# Patient Record
Sex: MALE | Race: WHITE | NOT HISPANIC OR LATINO | Employment: FULL TIME | URBAN - METROPOLITAN AREA
[De-identification: names, ages, dates, MRNs, and addresses within clinical notes are randomized per-mention and may not be internally consistent; named-entity substitution may affect disease eponyms.]

---

## 2017-03-16 ENCOUNTER — OFFICE VISIT (OUTPATIENT)
Dept: URGENT CARE | Facility: MEDICAL CENTER | Age: 42
End: 2017-03-16
Payer: COMMERCIAL

## 2017-03-16 PROCEDURE — G0382 LEV 3 HOSP TYPE B ED VISIT: HCPCS

## 2017-03-16 PROCEDURE — 87430 STREP A AG IA: CPT

## 2017-03-16 PROCEDURE — S9083 URGENT CARE CENTER GLOBAL: HCPCS

## 2017-08-15 ENCOUNTER — ALLSCRIPTS OFFICE VISIT (OUTPATIENT)
Dept: OTHER | Facility: OTHER | Age: 42
End: 2017-08-15

## 2017-08-15 DIAGNOSIS — N50.9 DISORDER OF MALE GENITAL ORGANS: ICD-10-CM

## 2017-08-15 LAB
BILIRUB UR QL STRIP: NORMAL
CLARITY UR: NORMAL
COLOR UR: YELLOW
GLUCOSE (HISTORICAL): NORMAL
HGB UR QL STRIP.AUTO: NORMAL
KETONES UR STRIP-MCNC: NORMAL MG/DL
LEUKOCYTE ESTERASE UR QL STRIP: NORMAL
NITRITE UR QL STRIP: NORMAL
PH UR STRIP.AUTO: 7 [PH]
PROT UR STRIP-MCNC: NORMAL MG/DL
SP GR UR STRIP.AUTO: 1.02
UROBILINOGEN UR QL STRIP.AUTO: 0.2

## 2017-08-16 ENCOUNTER — HOSPITAL ENCOUNTER (OUTPATIENT)
Dept: RADIOLOGY | Age: 42
Discharge: HOME | End: 2017-08-16
Payer: COMMERCIAL

## 2017-08-16 DIAGNOSIS — N50.9 DISORDER OF MALE GENITAL ORGANS: ICD-10-CM

## 2017-08-16 PROCEDURE — 76870 US EXAM SCROTUM: CPT

## 2017-08-31 ENCOUNTER — ALLSCRIPTS OFFICE VISIT (OUTPATIENT)
Dept: OTHER | Facility: OTHER | Age: 42
End: 2017-08-31

## 2017-08-31 LAB
BILIRUB UR QL STRIP: NORMAL
CLARITY UR: NORMAL
COLOR UR: YELLOW
GLUCOSE (HISTORICAL): NORMAL
HGB UR QL STRIP.AUTO: NORMAL
KETONES UR STRIP-MCNC: 80 MG/DL
LEUKOCYTE ESTERASE UR QL STRIP: NORMAL
NITRITE UR QL STRIP: NORMAL
PH UR STRIP.AUTO: 5.5 [PH]
PROT UR STRIP-MCNC: NORMAL MG/DL
SP GR UR STRIP.AUTO: 1.02
UROBILINOGEN UR QL STRIP.AUTO: 0.2

## 2018-01-14 VITALS
BODY MASS INDEX: 26.55 KG/M2 | WEIGHT: 196 LBS | HEIGHT: 72 IN | SYSTOLIC BLOOD PRESSURE: 144 MMHG | DIASTOLIC BLOOD PRESSURE: 100 MMHG

## 2018-01-14 VITALS
WEIGHT: 196 LBS | SYSTOLIC BLOOD PRESSURE: 128 MMHG | DIASTOLIC BLOOD PRESSURE: 84 MMHG | BODY MASS INDEX: 26.55 KG/M2 | HEIGHT: 72 IN

## 2018-02-28 DIAGNOSIS — N50.9 DISORDER OF MALE GENITAL ORGANS: ICD-10-CM

## 2018-03-01 ENCOUNTER — TELEPHONE (OUTPATIENT)
Dept: UROLOGY | Facility: AMBULATORY SURGERY CENTER | Age: 43
End: 2018-03-01

## 2018-03-01 NOTE — TELEPHONE ENCOUNTER
Patient should still get the [address] [facility] and follow up  Patient notified and will schedule US and make a F/U appt with Dr Veronica Sandhu

## 2018-03-01 NOTE — TELEPHONE ENCOUNTER
Patient would like a call back regarding an ultrasound  He says the lump he had was gone and wants to know if he should still get the test? Please call patient back

## 2018-05-21 ENCOUNTER — OFFICE VISIT (OUTPATIENT)
Dept: URGENT CARE | Facility: MEDICAL CENTER | Age: 43
End: 2018-05-21
Payer: COMMERCIAL

## 2018-05-21 ENCOUNTER — TELEPHONE (OUTPATIENT)
Dept: UROLOGY | Facility: AMBULATORY SURGERY CENTER | Age: 43
End: 2018-05-21

## 2018-05-21 VITALS
HEART RATE: 74 BPM | HEIGHT: 72 IN | TEMPERATURE: 98.3 F | DIASTOLIC BLOOD PRESSURE: 70 MMHG | WEIGHT: 193 LBS | OXYGEN SATURATION: 98 % | SYSTOLIC BLOOD PRESSURE: 116 MMHG | BODY MASS INDEX: 26.14 KG/M2 | RESPIRATION RATE: 20 BRPM

## 2018-05-21 DIAGNOSIS — D29.32 BENIGN NEOPLASM OF LEFT EPIDIDYMIS: Primary | ICD-10-CM

## 2018-05-21 DIAGNOSIS — H00.021 HORDEOLUM INTERNUM OF RIGHT UPPER EYELID: Primary | ICD-10-CM

## 2018-05-21 PROCEDURE — G0382 LEV 3 HOSP TYPE B ED VISIT: HCPCS | Performed by: FAMILY MEDICINE

## 2018-05-21 PROCEDURE — S9083 URGENT CARE CENTER GLOBAL: HCPCS | Performed by: FAMILY MEDICINE

## 2018-05-21 RX ORDER — ERYTHROMYCIN 5 MG/G
0.5 OINTMENT OPHTHALMIC EVERY 6 HOURS SCHEDULED
Qty: 3.5 G | Refills: 0 | Status: SHIPPED | OUTPATIENT
Start: 2018-05-21 | End: 2018-05-28

## 2018-05-21 NOTE — PROGRESS NOTES
Started Saturday with feeling like something was in right eye  Sunday morning swelling noted under eye  Take eye gtts  Used cold and hot compresses

## 2018-05-21 NOTE — TELEPHONE ENCOUNTER
LMOM unable to email to pt  Call back if he wants it faxed or mailed  I will put the order in [facility] chart  [facility]

## 2018-05-21 NOTE — PROGRESS NOTES
[address]        NAME: Caden Dewey is a 36 y o  male  : 1975    MRN: [id number]      Assessment and Plan   Hordeolum internum of right upper eyelid [H00 021]  1  Hordeolum internum of right upper eyelid  erythromycin (ILOTYCIN) ophthalmic ointment         Patient Instructions     Use antibiotic drops as directed    Always wipe away from eye with new part of rag  Cont warm compresses as directed  Follow up with PCP in 3-5 days  Proceed to  ER if symptoms worsen  Chief Complaint     Chief Complaint   Patient presents with    Eye Pain     x3 days          History of Present Illness       Right eye irritation x 3 days  Denies any discharge, changes in vision, contact lens use,  Fever/chills  Pt tried cold compresses and otc eye drops  Pt has never had this in past        Eye Pain    The right eye is affected  This is a new problem  Episode onset: x 3 days  The problem has been unchanged  There was no injury mechanism  The pain is at a severity of 4/10  The pain is mild  There is no known exposure to pink eye  He does not wear contacts  Associated symptoms include blurred vision, eye redness and a foreign body sensation  Pertinent negatives include no eye discharge, double vision, fever, itching, nausea, photophobia, recent URI or vomiting  Treatments tried: warm compress  Review of Systems   Review of Systems   Constitutional: Negative for chills, fatigue and fever  HENT: Negative for congestion, ear pain, hearing loss, postnasal drip, sinus pain, sinus pressure and sore throat  Eyes: Positive for blurred vision, pain and redness  Negative for double vision, photophobia, discharge and itching  Respiratory: Negative for chest tightness and shortness of breath  Cardiovascular: Negative for chest pain  Gastrointestinal: Negative for abdominal pain, constipation, nausea and vomiting  Genitourinary: Negative for difficulty urinating     Musculoskeletal: Negative for arthralgias and myalgias  Skin: Negative for rash  Neurological: Negative for dizziness and headaches  Psychiatric/Behavioral: Negative for behavioral problems  Current Medications       Current Outpatient Prescriptions:     B Complex Vitamins (B COMPLEX 1 PO), Take by mouth, Disp: , Rfl:     tadalafil (CIALIS) 20 MG tablet, Take 1 tablet by mouth, Disp: , Rfl:     erythromycin (ILOTYCIN) ophthalmic ointment, Administer 0 5 inches to the right eye every 6 (six) hours for 7 days, Disp: 3 5 g, Rfl: 0    oxybutynin (DITROPAN XL) 10 MG 24 hr tablet, Take by mouth, Disp: , Rfl:     Current Allergies     Allergies as of 05/21/2018    (No Known Allergies)            The following portions of the patient's history were reviewed and updated as appropriate: allergies, current medications, past family history, past medical history, past social history, past surgical history and problem list      Past Medical History:   Diagnosis Date    Cervicalgia     Epididymitis     Erectile dysfunction     Hypertension     Kidney stone     OAB (overactive bladder)        Past Surgical History:   Procedure Laterality Date    HERNIA REPAIR      VASECTOMY         No family history on file  Medications have been verified  Objective   /70   Pulse 74   Temp 98 3 °F (36 8 °C) (Temporal)   Resp 20   Ht 6' (1 829 m)   Wt 87 5 kg (193 lb)   SpO2 98%   BMI 26 18 kg/m²        Physical Exam     Physical Exam   Constitutional: He is oriented to person, place, and time  He appears well-developed and well-nourished  No distress  Eyes: EOM are normal  Pupils are equal, round, and reactive to light  Lids are everted and swept, no foreign bodies found  Right eye exhibits chemosis and hordeolum  Right eye exhibits no discharge  Right conjunctiva is injected  No scleral icterus  Mild erythema of lower right lid  No corneal abrasion seen on fluorescein stain      Cardiovascular: Normal rate, regular rhythm and normal heart sounds  Pulmonary/Chest: Effort normal and breath sounds normal  No respiratory distress  Neurological: He is alert and oriented to person, place, and time  Skin: No rash noted  Nursing note and vitals reviewed

## 2018-05-21 NOTE — PATIENT INSTRUCTIONS
Use antibiotic drops as directed    Always wipe away from eye with new part of rag  Cont warm compresses as directed  Stye   WHAT YOU NEED TO KNOW:   A stye is a lump on the edge or inside of your eyelid caused by an infection  A stye can form on your upper or lower eyelid  It usually goes away in 2 to 4 days  DISCHARGE INSTRUCTIONS:   Medicines:   · Antibiotic medicine: This is given as an ointment to put into your eye  It is used to fight an infection caused by bacteria  Use as directed  · Take your medicine as directed  Contact your healthcare provider if you think your medicine is not helping or if you have side effects  Tell him of her if you are allergic to any medicine  Keep a list of the medicines, vitamins, and herbs you take  Include the amounts, and when and why you take them  Bring the list or the pill bottles to follow-up visits  Carry your medicine list with you in case of an emergency  Follow up with your healthcare provider as directed:  Write down your questions so you remember to ask them during your visits  Self-care:   · Use warm compresses: This will help decrease swelling and pain  Wet a clean washcloth with warm water and place it on your eye for 10 to 15 minutes, 3 to 4 times each day or as directed  · Keep your hands away from your eye: This helps to prevent the spread of the infection to other parts of the eye  Wash your hands often with soap and dry with a clean towel  Do not squeeze the stye  · Do not use eye makeup:  Do not wear eye makeup while you have a stye  Eye makeup may carry bacteria and cause another stye  Throw away eye makeup and brushes used to apply the makeup  Use new eye makeup after the stye has gone away  Do not share eye makeup with others  · Prevent another stye:  Wash your face and clean your eyelashes every day  Remove eye makeup with makeup remover  This helps to completely remove eye makeup without heavy rubbing    Contact your healthcare provider if:   · You have redness and discharge around your eye, and your eye pain is getting worse  · Your vision changes  · The stye has not gone away within 7 days  · The stye comes back within a short period of time after treatment  · You have questions or concerns about your condition or care  © 2017 [address] [employer] Information is for End User's use only and may not be sold, redistributed or otherwise used for commercial purposes  All illustrations and images included in CareNotes® are the copyrighted property of [employer]  or Ulises Casey  The above information is an  only  It is not intended as medical advice for individual conditions or treatments  Talk to your doctor, nurse or pharmacist before following any medical regimen to see if it is safe and effective for you

## 2018-05-21 NOTE — TELEPHONE ENCOUNTER
Spoke with patient, he needs an ultrasound script emailed to him at [email or web address]     Please add in   Thank you

## 2018-11-10 RX ORDER — TADALAFIL 20 MG/1
TABLET ORAL
Qty: 6 TABLET | Refills: 11 | OUTPATIENT
Start: 2018-11-10

## 2018-11-13 NOTE — TELEPHONE ENCOUNTER
Patient was due back in August, 2018 for an office visit  No additional refills will be granted until the patient is seen by Dr Sapna Nvoak

## 2018-12-12 ENCOUNTER — TELEPHONE (OUTPATIENT)
Dept: UROLOGY | Facility: MEDICAL CENTER | Age: 43
End: 2018-12-12

## 2018-12-12 NOTE — TELEPHONE ENCOUNTER
Left detailed message, need for Scrotal US prior to scheduled 12/18/2018 visit   Provided Sr P[facility]itt's [employer] prhone number

## 2018-12-18 ENCOUNTER — OFFICE VISIT (OUTPATIENT)
Dept: UROLOGY | Facility: MEDICAL CENTER | Age: 43
End: 2018-12-18
Payer: COMMERCIAL

## 2018-12-18 VITALS
HEIGHT: 72 IN | HEART RATE: 80 BPM | BODY MASS INDEX: 26.28 KG/M2 | SYSTOLIC BLOOD PRESSURE: 138 MMHG | WEIGHT: 194 LBS | DIASTOLIC BLOOD PRESSURE: 98 MMHG

## 2018-12-18 DIAGNOSIS — I86.1 LEFT VARICOCELE: ICD-10-CM

## 2018-12-18 DIAGNOSIS — N52.02 CORPORO-VENOUS OCCLUSIVE ERECTILE DYSFUNCTION: Primary | ICD-10-CM

## 2018-12-18 DIAGNOSIS — N40.0 BPH WITHOUT OBSTRUCTION/LOWER URINARY TRACT SYMPTOMS: ICD-10-CM

## 2018-12-18 PROCEDURE — 99214 OFFICE O/P EST MOD 30 MIN: CPT | Performed by: UROLOGY

## 2018-12-18 RX ORDER — MULTIVIT WITH MINERALS/LUTEIN
1000 TABLET ORAL DAILY
COMMUNITY

## 2018-12-18 RX ORDER — TADALAFIL 20 MG/1
20 TABLET ORAL AS NEEDED
Qty: 10 TABLET | Refills: 11 | Status: SHIPPED | OUTPATIENT
Start: 2018-12-18 | End: 2020-01-17

## 2018-12-18 RX ORDER — OMEGA-3 FATTY ACIDS CAP DELAYED RELEASE 1000 MG 1000 MG
CAPSULE DELAYED RELEASE ORAL DAILY
COMMUNITY

## 2018-12-18 NOTE — PROGRESS NOTES
Assessment/Plan:  1  Urinary urgency, frequency, overactive bladder-for the most part resolved off of anticholinergics  2  BPH without obstruction-PSA is within acceptable limits but high for his decade of life  This will be repeated in a year  3  Erectile dysfunction-patient continues to function well on Cialis as noted below  4  Left varicocele-asymptomatic-grade 2      No problem-specific Assessment & Plan notes found for this encounter  Diagnoses and all orders for this visit:    Corporo-venous occlusive erectile dysfunction  -     PSA Total, Diagnostic; Future  -     Comprehensive metabolic panel; Future  -     Testosterone, free, total; Future  -     tadalafil (CIALIS) 20 MG tablet; Take 1 tablet (20 mg total) by mouth as needed for erectile dysfunction    BPH without obstruction/lower urinary tract symptoms  Comments:  Last PSA in 2016 was 1 9 which for a then 19-year-old male is considered within a normal range but certainly in the high normal for a man in his 45s  Orders:  -     PSA Total, Diagnostic; Future    Other orders  -     Ascorbic Acid (VITAMIN C) 1000 MG tablet; Take 1,000 mg by mouth daily  -     Omega-3 Fatty Acids (FISH OIL) 1000 MG CPDR; Take by mouth          Subjective:      Patient ID: Ed Perez is a 39 y o  male  Chief complaint:  Erectile dysfunction    History of present illness 41-year-old male who utilizes Cialis 10 mg twice weekly with an excellent erectile response without side effects returns requesting repeat prescription  In addition the patient had a history of overactive bladder and BPH with his symptoms of urgency and frequency markedly improving  They improved to such a degree that he discontinued oxybutynin and notes now that he has no significant voiding problem and is pleased with his voiding pattern off all medication in that regard  His current AUA symptom score is 5 with the patient noting a 2/5 for frequency as this principal complaint  He denies any dysuria gross hematuria or incontinence  The following portions of the patient's history were reviewed and updated as appropriate: allergies, current medications, past family history, past medical history, past social history, past surgical history and problem list     Review of Systems   All other systems reviewed and are negative  Objective:      /98   Pulse 80   Ht 6' (1 829 m)   Wt 88 kg (194 lb)   BMI 26 31 kg/m²          Physical Exam   Constitutional: He is oriented to person, place, and time  He appears well-nourished  HENT:   Head: Atraumatic  Eyes: EOM are normal    Neck: Neck supple  Pulmonary/Chest: Effort normal  No respiratory distress  Abdominal: Soft  Neurological: He is alert and oriented to person, place, and time  Psychiatric: He has a normal mood and affect  His behavior is normal  Judgment and thought content normal    Vitals reviewed

## 2018-12-18 NOTE — LETTER
December 18, 2018     Alber Ford DO  [address]  [address] [facility] [address]    Patient: Nasir Oliva   YOB: 1975   Date of Visit: 12/18/2018       Dear Dr Nicol Giordano: Thank you for referring Juan Velez to me for evaluation  Below are my notes for this consultation  If you have questions, please do not hesitate to call me  I look forward to following your patient along with you  Sincerely,        Sade Pulido MD        CC: No Recipients  Sade Pulido MD  12/18/2018  3:17 PM  Sign at close encounter  Assessment/Plan:  1  Urinary urgency, frequency, overactive bladder-for the most part resolved off of anticholinergics  2  BPH without obstruction-PSA is within acceptable limits but high for his decade of life  This will be repeated in a year  3  Erectile dysfunction-patient continues to function well on Cialis as noted below  4  Left varicocele-asymptomatic-grade 2      No problem-specific Assessment & Plan notes found for this encounter  Diagnoses and all orders for this visit:    Corporo-venous occlusive erectile dysfunction  -     PSA Total, Diagnostic; Future  -     Comprehensive metabolic panel; Future  -     Testosterone, free, total; Future  -     tadalafil (CIALIS) 20 MG tablet; Take 1 tablet (20 mg total) by mouth as needed for erectile dysfunction    BPH without obstruction/lower urinary tract symptoms  Comments:  Last PSA in 2016 was 1 9 which for a then 43-year-old male is considered within a normal range but certainly in the high normal for a man in his 45s  Orders:  -     PSA Total, Diagnostic; Future    Other orders  -     Ascorbic Acid (VITAMIN C) 1000 MG tablet; Take 1,000 mg by mouth daily  -     Omega-3 Fatty Acids (FISH OIL) 1000 MG CPDR; Take by mouth          Subjective:      Patient ID: Nasir Oliva is a 39 y o  male      Chief complaint:  Erectile dysfunction    History of present illness 43-year-old male who utilizes Cialis 10 mg twice weekly with an excellent erectile response without side effects returns requesting repeat prescription  In addition the patient had a history of overactive bladder and BPH with his symptoms of urgency and frequency markedly improving  They improved to such a degree that he discontinued oxybutynin and notes now that he has no significant voiding problem and is pleased with his voiding pattern off all medication in that regard  His current AUA symptom score is 5 with the patient noting a 2/5 for frequency as this principal complaint  He denies any dysuria gross hematuria or incontinence  The following portions of the patient's history were reviewed and updated as appropriate: allergies, current medications, past family history, past medical history, past social history, past surgical history and problem list     Review of Systems   All other systems reviewed and are negative  Objective:      /98   Pulse 80   Ht 6' (1 829 m)   Wt 88 kg (194 lb)   BMI 26 31 kg/m²           Physical Exam   Constitutional: He is oriented to person, place, and time  He appears well-nourished  HENT:   Head: Atraumatic  Eyes: EOM are normal    Neck: Neck supple  Pulmonary/Chest: Effort normal  No respiratory distress  Abdominal: Soft  Neurological: He is alert and oriented to person, place, and time  Psychiatric: He has a normal mood and affect  His behavior is normal  Judgment and thought content normal    Vitals reviewed

## 2019-05-07 ENCOUNTER — OFFICE VISIT (OUTPATIENT)
Dept: INTERNAL MEDICINE CLINIC | Age: 44
End: 2019-05-07
Payer: COMMERCIAL

## 2019-05-07 VITALS
TEMPERATURE: 97.1 F | WEIGHT: 183.6 LBS | SYSTOLIC BLOOD PRESSURE: 128 MMHG | HEIGHT: 71 IN | BODY MASS INDEX: 25.7 KG/M2 | HEART RATE: 72 BPM | OXYGEN SATURATION: 98 % | DIASTOLIC BLOOD PRESSURE: 82 MMHG

## 2019-05-07 DIAGNOSIS — Z13.31 POSITIVE DEPRESSION SCREENING: ICD-10-CM

## 2019-05-07 DIAGNOSIS — Z12.11 SCREENING FOR MALIGNANT NEOPLASM OF COLON: Primary | ICD-10-CM

## 2019-05-07 DIAGNOSIS — E78.2 MIXED HYPERLIPIDEMIA: ICD-10-CM

## 2019-05-07 DIAGNOSIS — I10 BENIGN ESSENTIAL HYPERTENSION: ICD-10-CM

## 2019-05-07 DIAGNOSIS — N52.9 ERECTILE DYSFUNCTION, UNSPECIFIED ERECTILE DYSFUNCTION TYPE: ICD-10-CM

## 2019-05-07 DIAGNOSIS — F41.1 GAD (GENERALIZED ANXIETY DISORDER): ICD-10-CM

## 2019-05-07 PROCEDURE — 3074F SYST BP LT 130 MM HG: CPT | Performed by: NURSE PRACTITIONER

## 2019-05-07 PROCEDURE — 3079F DIAST BP 80-89 MM HG: CPT | Performed by: NURSE PRACTITIONER

## 2019-05-07 PROCEDURE — 99204 OFFICE O/P NEW MOD 45 MIN: CPT | Performed by: NURSE PRACTITIONER

## 2019-05-07 RX ORDER — ALPRAZOLAM 0.5 MG/1
0.5 TABLET ORAL DAILY PRN
Qty: 10 TABLET | Refills: 0 | Status: SHIPPED | OUTPATIENT
Start: 2019-05-07 | End: 2019-05-29 | Stop reason: SDUPTHER

## 2019-05-08 ENCOUNTER — APPOINTMENT (OUTPATIENT)
Dept: LAB | Age: 44
End: 2019-05-08
Payer: COMMERCIAL

## 2019-05-08 DIAGNOSIS — I10 BENIGN ESSENTIAL HYPERTENSION: ICD-10-CM

## 2019-05-08 LAB
ALBUMIN SERPL BCP-MCNC: 4 G/DL (ref 3.5–5)
ALP SERPL-CCNC: 50 U/L (ref 46–116)
ALT SERPL W P-5'-P-CCNC: 28 U/L (ref 12–78)
ANION GAP SERPL CALCULATED.3IONS-SCNC: 4 MMOL/L (ref 4–13)
AST SERPL W P-5'-P-CCNC: 11 U/L (ref 5–45)
BASOPHILS # BLD AUTO: 0.03 THOUSANDS/ΜL (ref 0–0.1)
BASOPHILS NFR BLD AUTO: 1 % (ref 0–1)
BILIRUB SERPL-MCNC: 0.72 MG/DL (ref 0.2–1)
BUN SERPL-MCNC: 12 MG/DL (ref 5–25)
CALCIUM SERPL-MCNC: 8.8 MG/DL (ref 8.3–10.1)
CHLORIDE SERPL-SCNC: 104 MMOL/L (ref 100–108)
CHOLEST SERPL-MCNC: 186 MG/DL (ref 50–200)
CO2 SERPL-SCNC: 30 MMOL/L (ref 21–32)
CREAT SERPL-MCNC: 1 MG/DL (ref 0.6–1.3)
EOSINOPHIL # BLD AUTO: 0.05 THOUSAND/ΜL (ref 0–0.61)
EOSINOPHIL NFR BLD AUTO: 1 % (ref 0–6)
ERYTHROCYTE [DISTWIDTH] IN BLOOD BY AUTOMATED COUNT: 12.1 % (ref 11.6–15.1)
GFR SERPL CREATININE-BSD FRML MDRD: 91 ML/MIN/1.73SQ M
GLUCOSE P FAST SERPL-MCNC: 91 MG/DL (ref 65–99)
HCT VFR BLD AUTO: 49 % (ref 36.5–49.3)
HDLC SERPL-MCNC: 47 MG/DL (ref 40–60)
HGB BLD-MCNC: 16.5 G/DL (ref 12–17)
IMM GRANULOCYTES # BLD AUTO: 0.02 THOUSAND/UL (ref 0–0.2)
IMM GRANULOCYTES NFR BLD AUTO: 0 % (ref 0–2)
LDLC SERPL CALC-MCNC: 122 MG/DL (ref 0–100)
LYMPHOCYTES # BLD AUTO: 2.32 THOUSANDS/ΜL (ref 0.6–4.47)
LYMPHOCYTES NFR BLD AUTO: 36 % (ref 14–44)
MCH RBC QN AUTO: 30 PG (ref 26.8–34.3)
MCHC RBC AUTO-ENTMCNC: 33.7 G/DL (ref 31.4–37.4)
MCV RBC AUTO: 89 FL (ref 82–98)
MONOCYTES # BLD AUTO: 0.69 THOUSAND/ΜL (ref 0.17–1.22)
MONOCYTES NFR BLD AUTO: 11 % (ref 4–12)
NEUTROPHILS # BLD AUTO: 3.31 THOUSANDS/ΜL (ref 1.85–7.62)
NEUTS SEG NFR BLD AUTO: 51 % (ref 43–75)
NONHDLC SERPL-MCNC: 139 MG/DL
NRBC BLD AUTO-RTO: 0 /100 WBCS
PLATELET # BLD AUTO: 252 THOUSANDS/UL (ref 149–390)
PMV BLD AUTO: 10.4 FL (ref 8.9–12.7)
POTASSIUM SERPL-SCNC: 3.8 MMOL/L (ref 3.5–5.3)
PROT SERPL-MCNC: 7.2 G/DL (ref 6.4–8.2)
RBC # BLD AUTO: 5.5 MILLION/UL (ref 3.88–5.62)
SODIUM SERPL-SCNC: 138 MMOL/L (ref 136–145)
TRIGL SERPL-MCNC: 87 MG/DL
TSH SERPL DL<=0.05 MIU/L-ACNC: 0.85 UIU/ML (ref 0.36–3.74)
WBC # BLD AUTO: 6.42 THOUSAND/UL (ref 4.31–10.16)

## 2019-05-08 PROCEDURE — 84443 ASSAY THYROID STIM HORMONE: CPT | Performed by: NURSE PRACTITIONER

## 2019-05-08 PROCEDURE — 80053 COMPREHEN METABOLIC PANEL: CPT

## 2019-05-08 PROCEDURE — 80061 LIPID PANEL: CPT | Performed by: NURSE PRACTITIONER

## 2019-05-08 PROCEDURE — 85025 COMPLETE CBC W/AUTO DIFF WBC: CPT | Performed by: NURSE PRACTITIONER

## 2019-05-08 PROCEDURE — 36415 COLL VENOUS BLD VENIPUNCTURE: CPT

## 2019-05-14 ENCOUNTER — OFFICE VISIT (OUTPATIENT)
Dept: INTERNAL MEDICINE CLINIC | Age: 44
End: 2019-05-14
Payer: COMMERCIAL

## 2019-05-14 VITALS
HEART RATE: 74 BPM | BODY MASS INDEX: 25.99 KG/M2 | WEIGHT: 185.2 LBS | SYSTOLIC BLOOD PRESSURE: 128 MMHG | DIASTOLIC BLOOD PRESSURE: 88 MMHG | OXYGEN SATURATION: 97 % | TEMPERATURE: 96.9 F

## 2019-05-14 DIAGNOSIS — Z12.11 SCREENING FOR MALIGNANT NEOPLASM OF COLON: Primary | ICD-10-CM

## 2019-05-14 DIAGNOSIS — F41.1 GAD (GENERALIZED ANXIETY DISORDER): ICD-10-CM

## 2019-05-14 DIAGNOSIS — E78.2 MIXED HYPERLIPIDEMIA: ICD-10-CM

## 2019-05-14 PROCEDURE — 99213 OFFICE O/P EST LOW 20 MIN: CPT | Performed by: NURSE PRACTITIONER

## 2019-05-14 RX ORDER — ESCITALOPRAM OXALATE 10 MG/1
10 TABLET ORAL DAILY
Qty: 30 TABLET | Refills: 0 | Status: SHIPPED | OUTPATIENT
Start: 2019-05-14 | End: 2019-06-07 | Stop reason: SDUPTHER

## 2019-05-14 RX ORDER — MULTIVIT-MIN/IRON/FOLIC ACID/K 18-600-40
1 CAPSULE ORAL DAILY
COMMUNITY

## 2019-05-24 ENCOUNTER — TELEPHONE (OUTPATIENT)
Dept: INTERNAL MEDICINE CLINIC | Age: 44
End: 2019-05-24

## 2019-05-29 DIAGNOSIS — F41.1 GAD (GENERALIZED ANXIETY DISORDER): ICD-10-CM

## 2019-05-29 RX ORDER — ALPRAZOLAM 0.5 MG/1
0.5 TABLET ORAL DAILY PRN
Qty: 10 TABLET | Refills: 0 | Status: SHIPPED | OUTPATIENT
Start: 2019-05-29 | End: 2019-07-11 | Stop reason: SDUPTHER

## 2019-06-07 DIAGNOSIS — F41.1 GAD (GENERALIZED ANXIETY DISORDER): ICD-10-CM

## 2019-06-07 RX ORDER — ESCITALOPRAM OXALATE 10 MG/1
10 TABLET ORAL DAILY
Qty: 30 TABLET | Refills: 0 | Status: SHIPPED | OUTPATIENT
Start: 2019-06-07 | End: 2019-06-14

## 2019-06-14 ENCOUNTER — OFFICE VISIT (OUTPATIENT)
Dept: INTERNAL MEDICINE CLINIC | Age: 44
End: 2019-06-14
Payer: COMMERCIAL

## 2019-06-14 VITALS
HEART RATE: 68 BPM | TEMPERATURE: 98.7 F | OXYGEN SATURATION: 97 % | WEIGHT: 187 LBS | SYSTOLIC BLOOD PRESSURE: 128 MMHG | BODY MASS INDEX: 25.33 KG/M2 | DIASTOLIC BLOOD PRESSURE: 78 MMHG | HEIGHT: 72 IN

## 2019-06-14 DIAGNOSIS — F41.1 GAD (GENERALIZED ANXIETY DISORDER): ICD-10-CM

## 2019-06-14 PROCEDURE — 3008F BODY MASS INDEX DOCD: CPT | Performed by: NURSE PRACTITIONER

## 2019-06-14 PROCEDURE — 1036F TOBACCO NON-USER: CPT | Performed by: NURSE PRACTITIONER

## 2019-06-14 PROCEDURE — 99213 OFFICE O/P EST LOW 20 MIN: CPT | Performed by: NURSE PRACTITIONER

## 2019-06-14 RX ORDER — ESCITALOPRAM OXALATE 20 MG/1
20 TABLET ORAL DAILY
Qty: 90 TABLET | Refills: 0 | Status: SHIPPED | OUTPATIENT
Start: 2019-06-14 | End: 2020-02-19 | Stop reason: HOSPADM

## 2019-07-07 DIAGNOSIS — F41.1 GAD (GENERALIZED ANXIETY DISORDER): ICD-10-CM

## 2019-07-07 RX ORDER — ESCITALOPRAM OXALATE 10 MG/1
TABLET ORAL
Qty: 30 TABLET | Refills: 5 | OUTPATIENT
Start: 2019-07-07

## 2019-07-11 DIAGNOSIS — F41.1 GAD (GENERALIZED ANXIETY DISORDER): ICD-10-CM

## 2019-07-12 RX ORDER — ALPRAZOLAM 0.5 MG/1
0.5 TABLET ORAL DAILY PRN
Qty: 10 TABLET | Refills: 0 | Status: SHIPPED | OUTPATIENT
Start: 2019-07-12 | End: 2019-09-16 | Stop reason: SDUPTHER

## 2019-07-12 RX ORDER — VITAMIN B COMPLEX
1 TABLET ORAL DAILY
COMMUNITY

## 2019-07-12 RX ORDER — B-COMPLEX WITH VITAMIN C
1 TABLET ORAL DAILY
COMMUNITY
Start: 2018-05-14

## 2019-07-17 ENCOUNTER — OFFICE VISIT (OUTPATIENT)
Dept: INTERNAL MEDICINE CLINIC | Facility: CLINIC | Age: 44
End: 2019-07-17
Payer: COMMERCIAL

## 2019-07-17 VITALS
BODY MASS INDEX: 25.88 KG/M2 | DIASTOLIC BLOOD PRESSURE: 80 MMHG | OXYGEN SATURATION: 98 % | SYSTOLIC BLOOD PRESSURE: 126 MMHG | HEART RATE: 73 BPM | TEMPERATURE: 98.4 F | WEIGHT: 190 LBS

## 2019-07-17 DIAGNOSIS — F41.1 GAD (GENERALIZED ANXIETY DISORDER): ICD-10-CM

## 2019-07-17 DIAGNOSIS — Z13.31 POSITIVE DEPRESSION SCREENING: Primary | ICD-10-CM

## 2019-07-17 PROCEDURE — 1036F TOBACCO NON-USER: CPT | Performed by: NURSE PRACTITIONER

## 2019-07-17 PROCEDURE — 99213 OFFICE O/P EST LOW 20 MIN: CPT | Performed by: NURSE PRACTITIONER

## 2019-07-17 NOTE — ASSESSMENT & PLAN NOTE
Patient has been using Xanax very sparingly, patient currently taking 15 mg of Lexapro, with plans reduce back down to 10mg, patient will alternate 15 and 10 mg for this week, then reduce to 10 mg next week  Advised patient not to make medication changes without discussing, and to not abruptly stop this medication, we will keep patient has scheduled follow-up

## 2019-07-17 NOTE — PROGRESS NOTES
Assessment/Plan:    CONNER (generalized anxiety disorder)  Patient has been using Xanax very sparingly, patient currently taking 15 mg of Lexapro, with plans reduce back down to 10mg, patient will alternate 15 and 10 mg for this week, then reduce to 10 mg next week  Advised patient not to make medication changes without discussing, and to not abruptly stop this medication, we will keep patient has scheduled follow-up  Diagnoses and all orders for this visit:    Positive depression screening    CONNER (generalized anxiety disorder)          Subjective:      Patient ID: Marti Dubin is a 39 y o  male  HPI     Pt is here today to discuss changes to his anxiety medication  Pt is currently on Lexapro 20 mg  Pt decreased his dose of Lexapro to 15 mg about 7 days  Pt reports a lot of side effects with the 20 mg such as diarrhea (intermittent for 2 and a half weeks), feeling disconnected for reality/life in general, difficulty concentrating, and more tired and fatigued  Pt reports he is sleeping normally getting 7-8 hours of sleep  He reports just feeling more sluggish and tired especially when waking up in the morning  Pt states his anxiety symptoms have been stable on this dose  He reports the "up and down" emotions and panic attacks he was having since October have resolved  Pt states he felt more comfortable at his prior dose of Lexapro 10 mg and would like to try that dose again before completely switching medications  Pt states he has felt better since decreasing his dose to 15 mg  Pt denies any new stressors  Pt denies any depressive thoughts  Denies suicidal or homicidal ideations  The following portions of the patient's history were reviewed and updated as appropriate: allergies, current medications, past family history, past medical history, past social history, past surgical history and problem list     Review of Systems   Constitutional: Positive for fatigue   Negative for activity change, appetite change, chills, diaphoresis and fever  HENT: Negative for congestion, ear discharge, ear pain, postnasal drip, rhinorrhea, sinus pressure, sinus pain and sore throat  Eyes: Negative for pain, discharge, itching and visual disturbance  Respiratory: Negative for cough, chest tightness, shortness of breath and wheezing  Cardiovascular: Negative for chest pain, palpitations and leg swelling  Gastrointestinal: Positive for diarrhea  Negative for abdominal pain, blood in stool, constipation, nausea and vomiting  Endocrine: Negative for polydipsia, polyphagia and polyuria  Genitourinary: Negative for difficulty urinating, dysuria and urgency  Musculoskeletal: Negative for arthralgias, back pain and neck pain  Skin: Negative for rash and wound  Neurological: Negative for dizziness, weakness, light-headedness, numbness and headaches  Psychiatric/Behavioral: Positive for decreased concentration  Negative for agitation, dysphoric mood, self-injury, sleep disturbance and suicidal ideas  The patient is nervous/anxious (controlled)            Past Medical History:   Diagnosis Date    Anxiety     BPH with obstruction/lower urinary tract symptoms 2015    Cervicalgia     Corporo-venous occlusive erectile dysfunction 2017    Epididymitis 2017    Erectile dysfunction 2014    Hypertension     Kidney stone     OAB (overactive bladder)     Urge incontinence 2014    Urgency of urination 2017         Current Outpatient Medications:     ALPRAZolam (XANAX) 0 5 mg tablet, Take 1 tablet (0 5 mg total) by mouth daily as needed for anxiety (for severe anxiety), Disp: 10 tablet, Rfl: 0    Ascorbic Acid (VITAMIN C) 1000 MG tablet, Take 1,000 mg by mouth daily, Disp: , Rfl:     B Complex Vitamins (B COMPLEX 1 PO), Take by mouth, Disp: , Rfl:     Cholecalciferol (VITAMIN D) 2000 units CAPS, Take 1 capsule by mouth daily, Disp: , Rfl:     Coenzyme Q10 (COQ10) 100 MG CAPS, Take 1 capsule by mouth daily, Disp: , Rfl:     escitalopram (LEXAPRO) 20 mg tablet, Take 1 tablet (20 mg total) by mouth daily, Disp: 90 tablet, Rfl: 0    Omega-3 Fatty Acids (FISH OIL) 1000 MG CPDR, Take by mouth, Disp: , Rfl:     tadalafil (CIALIS) 20 MG tablet, Take 1 tablet (20 mg total) by mouth as needed for erectile dysfunction (Patient taking differently: Take 20 mg by mouth as needed for erectile dysfunction Break pill in half for 10 mg pill), Disp: 10 tablet, Rfl: 11    Zinc 100 MG TABS, Take 1 tablet by mouth daily, Disp: , Rfl:     No Known Allergies    Social History   Past Surgical History:   Procedure Laterality Date    HERNIA REPAIR      VASECTOMY       Family History   Problem Relation Age of Onset    Lung cancer Family     Cancer Mother     Cancer Father     Cancer Maternal Grandmother     Heart disease Maternal Grandfather        Objective:  /80 (BP Location: Left arm, Patient Position: Sitting, Cuff Size: Large)   Pulse 73   Temp 98 4 °F (36 9 °C) (Oral)   Wt 86 2 kg (190 lb) Comment: with shoes  SpO2 98% Comment: ra  BMI 25 88 kg/m²        Physical Exam   Constitutional: He is oriented to person, place, and time  He appears well-developed and well-nourished  No distress  HENT:   Head: Normocephalic and atraumatic  Right Ear: External ear normal    Left Ear: External ear normal    Nose: Nose normal    Mouth/Throat: Oropharynx is clear and moist  No oropharyngeal exudate  Eyes: Pupils are equal, round, and reactive to light  Conjunctivae and EOM are normal  Right eye exhibits no discharge  Left eye exhibits no discharge  Neck: Normal range of motion  Neck supple  No thyromegaly present  Cardiovascular: Normal rate, regular rhythm, normal heart sounds and intact distal pulses  Exam reveals no gallop and no friction rub  No murmur heard  Pulmonary/Chest: Effort normal and breath sounds normal  No stridor  No respiratory distress  He has no wheezes  He has no rales  Abdominal: Soft   Bowel sounds are normal  He exhibits no distension  There is no tenderness  Lymphadenopathy:     He has no cervical adenopathy  Neurological: He is alert and oriented to person, place, and time  Skin: Skin is warm and dry  No rash noted  He is not diaphoretic  No erythema  Psychiatric: He has a normal mood and affect   His behavior is normal  Judgment and thought content normal

## 2019-09-13 NOTE — PROGRESS NOTES
Assessment/Plan:    Sore throat (viral)  Illness appears to be viral in nature  Rest and fluids advised  Educated that the course of this illness could be 2-4 weeks  Discussed symptomatic relief, such as warm steam inhalations, tylenol/ibuprofen for fevers and body aches, rest, and drink plenty of fluids  Warm salt gargles for sore throat  Discussed red flag signs to go to the ER, such as chest pain or shortness of breath  Return to the office for reevaluation if symptoms worsen or do not improve in 1-2 weeks    Advised patient to call Friday if symptoms did not improve would offer antibiotic  Feel that sore throat may be related to postnasal drip  Advised patient to continue the antihistamine and add on Flonase  CONNER (generalized anxiety disorder)    Patient continues to use Xanax very sparingly, patient's current dose of Lexapro is 10 mg  Advised patient to not abruptly stop this medication  Have symptoms of anxiety persist may need to add on BuSpar, patient continues to follow-up with Psychology  Will follow up in 6 months  Diagnoses and all orders for this visit:    Sore throat (viral)    CONNER (generalized anxiety disorder)  -     ALPRAZolam (XANAX) 0 5 mg tablet; Take 1 tablet (0 5 mg total) by mouth daily as needed for anxiety (for severe anxiety)    Seasonal allergies          Subjective:      Patient ID: Bradly Wong is a 39 y o  male  Patient presents today to follow-up on anxiety, patient also reports that he has a sore throat which started yesterday  Patient is currently taking Lexapro 10 mg tablet daily, he is seeing Psychology every 2 weeks and has been using his Xanax very sparingly, he states overall that his panic attack is much less  Note from previous office visit:  Pt is here today to discuss changes to his anxiety medication  Pt is currently on Lexapro 20 mg  Pt decreased his dose of Lexapro to 15 mg about 7 days   Pt reports a lot of side effects with the 20 mg such as diarrhea (intermittent for 2 and a half weeks), feeling disconnected for reality/life in general, difficulty concentrating, and more tired and fatigued  Pt reports he is sleeping normally getting 7-8 hours of sleep  He reports just feeling more sluggish and tired especially when waking up in the morning  Pt states his anxiety symptoms have been stable on this dose  He reports the "up and down" emotions and panic attacks he was having since October have resolved  Pt states he felt more comfortable at his prior dose of Lexapro 10 mg and would like to try that dose again before completely switching medications  Pt states he has felt better since decreasing his dose to 15 mg  Pt denies any new stressors  Pt denies any depressive thoughts  Denies suicidal or homicidal ideations         Sore Throat    This is a new (wife ans child sick) problem  The current episode started yesterday  The problem has been gradually worsening  There has been no fever  Associated symptoms include congestion  Pertinent negatives include no abdominal pain, coughing, diarrhea, ear discharge, ear pain, headaches, neck pain, shortness of breath or vomiting  He has had no exposure to strep  He has tried nothing for the symptoms  The treatment provided no relief  The following portions of the patient's history were reviewed and updated as appropriate: allergies, current medications, past family history, past medical history, past social history, past surgical history and problem list     Review of Systems   Constitutional: Negative for activity change, appetite change, chills, diaphoresis, fatigue and fever  HENT: Positive for congestion and sore throat  Negative for ear discharge, ear pain, postnasal drip, rhinorrhea, sinus pressure and sinus pain  Eyes: Negative for pain, discharge, itching and visual disturbance  Respiratory: Negative for cough, chest tightness, shortness of breath and wheezing      Cardiovascular: Negative for chest pain, palpitations and leg swelling  Gastrointestinal: Negative for abdominal pain, blood in stool, constipation, diarrhea, nausea and vomiting  Endocrine: Negative for polydipsia, polyphagia and polyuria  Genitourinary: Negative for difficulty urinating, dysuria and urgency  Musculoskeletal: Negative for arthralgias, back pain and neck pain  Skin: Negative for rash and wound  Allergic/Immunologic: Positive for environmental allergies  Neurological: Negative for dizziness, weakness, light-headedness, numbness and headaches  Psychiatric/Behavioral: Positive for decreased concentration  Negative for agitation, dysphoric mood, self-injury, sleep disturbance and suicidal ideas  The patient is nervous/anxious (controlled)            Past Medical History:   Diagnosis Date    Anxiety     BPH with obstruction/lower urinary tract symptoms 2015    Cervicalgia     Corporo-venous occlusive erectile dysfunction 2017    Epididymitis 2017    Erectile dysfunction 2014    Hypertension     Kidney stone     OAB (overactive bladder)     Urge incontinence 2014    Urgency of urination 2017         Current Outpatient Medications:     ALPRAZolam (XANAX) 0 5 mg tablet, Take 1 tablet (0 5 mg total) by mouth daily as needed for anxiety (for severe anxiety), Disp: 10 tablet, Rfl: 0    Ascorbic Acid (VITAMIN C) 1000 MG tablet, Take 1,000 mg by mouth daily, Disp: , Rfl:     B Complex Vitamins (B COMPLEX 1 PO), Take by mouth daily , Disp: , Rfl:     Cholecalciferol (VITAMIN D) 2000 units CAPS, Take 1 capsule by mouth daily, Disp: , Rfl:     Coenzyme Q10 (COQ10) 100 MG CAPS, Take 1 capsule by mouth daily, Disp: , Rfl:     escitalopram (LEXAPRO) 20 mg tablet, Take 1 tablet (20 mg total) by mouth daily (Patient taking differently: Take 10 mg by mouth daily ), Disp: 90 tablet, Rfl: 0    Omega-3 Fatty Acids (FISH OIL) 1000 MG CPDR, Take by mouth daily , Disp: , Rfl:     tadalafil (CIALIS) 20 MG tablet, Take 1 tablet (20 mg total) by mouth as needed for erectile dysfunction (Patient taking differently: Take 20 mg by mouth as needed for erectile dysfunction Break pill in half for 10 mg pill), Disp: 10 tablet, Rfl: 11    Zinc 100 MG TABS, Take 1 tablet by mouth daily, Disp: , Rfl:     No Known Allergies    Social History   Past Surgical History:   Procedure Laterality Date    HERNIA REPAIR      VASECTOMY       Family History   Problem Relation Age of Onset    Lung cancer Family     Cancer Mother     Cancer Father     Cancer Maternal Grandmother     Heart disease Maternal Grandfather        Objective:  /82 (BP Location: Left arm, Patient Position: Sitting, Cuff Size: Standard)   Pulse 78   Temp (!) 97 °F (36 1 °C) (Tympanic)   Wt 87 2 kg (192 lb 3 2 oz)   SpO2 96%   BMI 26 18 kg/m²     No results found for this or any previous visit (from the past 1344 hour(s))  Physical Exam   Constitutional: He is oriented to person, place, and time  He appears well-developed and well-nourished  No distress  HENT:   Head: Normocephalic and atraumatic  Right Ear: External ear normal  Tympanic membrane is bulging  Tympanic membrane is not erythematous  A middle ear effusion is present  Left Ear: External ear normal  Tympanic membrane is bulging  Tympanic membrane is not erythematous  A middle ear effusion is present  Nose: Nose normal    Mouth/Throat: Mucous membranes are pale and dry  Posterior oropharyngeal erythema (mild) present  No oropharyngeal exudate or posterior oropharyngeal edema  Eyes: Pupils are equal, round, and reactive to light  Conjunctivae and EOM are normal  Right eye exhibits no discharge  Left eye exhibits no discharge  Neck: Normal range of motion  Neck supple  No thyromegaly present  Cardiovascular: Normal rate, regular rhythm, normal heart sounds and intact distal pulses  Exam reveals no gallop and no friction rub  No murmur heard    Pulmonary/Chest: Effort normal and breath sounds normal  No stridor  No respiratory distress  He has no wheezes  He has no rales  Abdominal: Soft  Bowel sounds are normal  He exhibits no distension  There is no tenderness  Lymphadenopathy:     He has no cervical adenopathy  Neurological: He is alert and oriented to person, place, and time  Skin: Skin is warm and dry  No rash noted  He is not diaphoretic  No erythema  Psychiatric: He has a normal mood and affect   His behavior is normal  Judgment and thought content normal

## 2019-09-16 ENCOUNTER — OFFICE VISIT (OUTPATIENT)
Dept: INTERNAL MEDICINE CLINIC | Age: 44
End: 2019-09-16
Payer: COMMERCIAL

## 2019-09-16 VITALS
DIASTOLIC BLOOD PRESSURE: 82 MMHG | HEART RATE: 78 BPM | TEMPERATURE: 97 F | OXYGEN SATURATION: 96 % | BODY MASS INDEX: 26.18 KG/M2 | WEIGHT: 192.2 LBS | SYSTOLIC BLOOD PRESSURE: 118 MMHG

## 2019-09-16 DIAGNOSIS — F41.1 GAD (GENERALIZED ANXIETY DISORDER): ICD-10-CM

## 2019-09-16 DIAGNOSIS — B97.89 SORE THROAT (VIRAL): Primary | ICD-10-CM

## 2019-09-16 DIAGNOSIS — J30.2 SEASONAL ALLERGIES: ICD-10-CM

## 2019-09-16 DIAGNOSIS — J02.8 SORE THROAT (VIRAL): Primary | ICD-10-CM

## 2019-09-16 PROCEDURE — 99213 OFFICE O/P EST LOW 20 MIN: CPT | Performed by: NURSE PRACTITIONER

## 2019-09-16 RX ORDER — ALPRAZOLAM 0.5 MG/1
0.5 TABLET ORAL DAILY PRN
Qty: 10 TABLET | Refills: 0 | Status: SHIPPED | OUTPATIENT
Start: 2019-09-16 | End: 2020-03-12 | Stop reason: SDUPTHER

## 2019-09-16 NOTE — ASSESSMENT & PLAN NOTE
Illness appears to be viral in nature  Rest and fluids advised  Educated that the course of this illness could be 2-4 weeks  Discussed symptomatic relief, such as warm steam inhalations, tylenol/ibuprofen for fevers and body aches, rest, and drink plenty of fluids  Warm salt gargles for sore throat  Discussed red flag signs to go to the ER, such as chest pain or shortness of breath  Return to the office for reevaluation if symptoms worsen or do not improve in 1-2 weeks    Advised patient to call Friday if symptoms did not improve would offer antibiotic  Feel that sore throat may be related to postnasal drip  Advised patient to continue the antihistamine and add on Flonase

## 2019-09-16 NOTE — ASSESSMENT & PLAN NOTE
Patient continues to use Xanax very sparingly, patient's current dose of Lexapro is 10 mg  Advised patient to not abruptly stop this medication  Have symptoms of anxiety persist may need to add on BuSpar, patient continues to follow-up with Psychology  Will follow up in 6 months

## 2020-01-07 DIAGNOSIS — N52.02 CORPORO-VENOUS OCCLUSIVE ERECTILE DYSFUNCTION: Primary | ICD-10-CM

## 2020-01-07 DIAGNOSIS — N40.0 BPH WITHOUT OBSTRUCTION/LOWER URINARY TRACT SYMPTOMS: ICD-10-CM

## 2020-01-17 DIAGNOSIS — N52.02 CORPORO-VENOUS OCCLUSIVE ERECTILE DYSFUNCTION: ICD-10-CM

## 2020-01-17 RX ORDER — TADALAFIL 20 MG/1
20 TABLET ORAL AS NEEDED
Qty: 10 TABLET | Status: SHIPPED | OUTPATIENT
Start: 2020-01-17 | End: 2021-03-01 | Stop reason: SDUPTHER

## 2020-02-19 ENCOUNTER — OFFICE VISIT (OUTPATIENT)
Dept: INTERNAL MEDICINE CLINIC | Facility: CLINIC | Age: 45
End: 2020-02-19
Payer: COMMERCIAL

## 2020-02-19 VITALS
OXYGEN SATURATION: 97 % | BODY MASS INDEX: 26.33 KG/M2 | WEIGHT: 194.4 LBS | HEART RATE: 117 BPM | DIASTOLIC BLOOD PRESSURE: 80 MMHG | HEIGHT: 72 IN | SYSTOLIC BLOOD PRESSURE: 106 MMHG | TEMPERATURE: 101.1 F

## 2020-02-19 DIAGNOSIS — J06.9 VIRAL UPPER RESPIRATORY TRACT INFECTION: Primary | ICD-10-CM

## 2020-02-19 PROCEDURE — 3008F BODY MASS INDEX DOCD: CPT | Performed by: INTERNAL MEDICINE

## 2020-02-19 PROCEDURE — 1036F TOBACCO NON-USER: CPT | Performed by: INTERNAL MEDICINE

## 2020-02-19 PROCEDURE — 3079F DIAST BP 80-89 MM HG: CPT | Performed by: INTERNAL MEDICINE

## 2020-02-19 PROCEDURE — 99213 OFFICE O/P EST LOW 20 MIN: CPT | Performed by: INTERNAL MEDICINE

## 2020-02-19 PROCEDURE — 3074F SYST BP LT 130 MM HG: CPT | Performed by: INTERNAL MEDICINE

## 2020-02-19 PROCEDURE — 87631 RESP VIRUS 3-5 TARGETS: CPT | Performed by: INTERNAL MEDICINE

## 2020-02-19 RX ORDER — OSELTAMIVIR PHOSPHATE 75 MG/1
75 CAPSULE ORAL 2 TIMES DAILY
Qty: 10 CAPSULE | Refills: 0 | Status: SHIPPED | OUTPATIENT
Start: 2020-02-19 | End: 2020-02-24

## 2020-02-19 NOTE — PROGRESS NOTES
Assessment/Plan:    1  Upper respiratory tract infection/viral most likely influenza  Will start patient on Tamiflu 75 mg p o  B i d  for 5 days  Also advised to take over-the-counter cold medicine for symptoms  Tylenol p r n  For pain fatigue and chills  Increase fluid intake  We also did nasal swab for flu cultures  Diagnoses and all orders for this visit:    Viral upper respiratory tract infection  -     oseltamivir (TAMIFLU) 75 mg capsule; Take 1 capsule (75 mg total) by mouth 2 (two) times a day for 5 days               Subjective:          Patient ID: Jesusita Nissen is a 38 y o  male  Patient came to the office with a complain of sinus congestion sore throat cough  Also have a fever  Started yesterday  He does have mild symptom few days ago but it get worse now  No flu vaccination  No exposure to sick person  The following portions of the patient's history were reviewed and updated as appropriate: allergies, current medications, past family history, past medical history, past social history, past surgical history and problem list     Review of Systems   Constitutional: Positive for chills, fatigue and fever  HENT: Positive for congestion, postnasal drip and sore throat  Negative for ear discharge, ear pain, sinus pressure, tinnitus and trouble swallowing  Eyes: Negative for discharge, itching and visual disturbance  Respiratory: Positive for cough  Negative for shortness of breath  Cardiovascular: Negative for chest pain and palpitations  Gastrointestinal: Negative for abdominal pain, diarrhea, nausea and vomiting  Endocrine: Negative for cold intolerance and polyuria  Genitourinary: Negative for difficulty urinating, dysuria and urgency  Musculoskeletal: Negative for arthralgias and neck pain  Skin: Negative for rash  Allergic/Immunologic: Negative for environmental allergies  Neurological: Negative for dizziness, weakness and headaches  Psychiatric/Behavioral: The patient is not nervous/anxious            Past Medical History:   Diagnosis Date    Anxiety     BPH with obstruction/lower urinary tract symptoms 2015    Cervicalgia     Corporo-venous occlusive erectile dysfunction 2017    Epididymitis 2017    Erectile dysfunction 2014    Hypertension     Kidney stone     OAB (overactive bladder)     Urge incontinence 2014    Urgency of urination 2017         Current Outpatient Medications:     ALPRAZolam (XANAX) 0 5 mg tablet, Take 1 tablet (0 5 mg total) by mouth daily as needed for anxiety (for severe anxiety), Disp: 10 tablet, Rfl: 0    Ascorbic Acid (VITAMIN C) 1000 MG tablet, Take 1,000 mg by mouth daily, Disp: , Rfl:     B Complex Vitamins (B COMPLEX 1 PO), Take by mouth daily , Disp: , Rfl:     Cholecalciferol (VITAMIN D) 2000 units CAPS, Take 1 capsule by mouth daily, Disp: , Rfl:     Coenzyme Q10 (COQ10) 100 MG CAPS, Take 1 capsule by mouth daily, Disp: , Rfl:     Omega-3 Fatty Acids (FISH OIL) 1000 MG CPDR, Take by mouth daily , Disp: , Rfl:     tadalafil (CIALIS) 20 MG tablet, Take 1 tablet (20 mg total) by mouth as needed for erectile dysfunction, Disp: 10 tablet, Rfl: prn    Zinc 100 MG TABS, Take 1 tablet by mouth daily, Disp: , Rfl:     oseltamivir (TAMIFLU) 75 mg capsule, Take 1 capsule (75 mg total) by mouth 2 (two) times a day for 5 days, Disp: 10 capsule, Rfl: 0    No Known Allergies    Social History   Past Surgical History:   Procedure Laterality Date    HERNIA REPAIR      VASECTOMY       Family History   Problem Relation Age of Onset    Lung cancer Mother     Lung cancer Father     Cancer Maternal Grandmother         unknown    Heart disease Maternal Grandfather     Prostate cancer Maternal Grandfather        Objective:  /80 (BP Location: Left arm, Patient Position: Sitting, Cuff Size: Adult)   Pulse (!) 117   Temp (!) 101 1 °F (38 4 °C) (Oral)   Ht 6' (1 829 m)   Wt 88 2 kg (194 lb 6 4 oz) Comment: w shoes  SpO2 97% Comment: ra  BMI 26 37 kg/m²   Body mass index is 26 37 kg/m²  Physical Exam   Constitutional: He appears well-developed  HENT:   Head: Normocephalic  Right Ear: External ear normal    Left Ear: External ear normal    Mouth/Throat: Oropharynx is clear and moist    Mild posterior congestion  No exudate  Ear examination is unremarkable  Eyes: Pupils are equal, round, and reactive to light  No scleral icterus  Neck: Normal range of motion  Neck supple  No tracheal deviation present  No thyromegaly present  Cardiovascular: Normal rate, regular rhythm and normal heart sounds  Pulmonary/Chest: Effort normal and breath sounds normal  No respiratory distress  He has no wheezes  He exhibits no tenderness  Abdominal: Soft  Bowel sounds are normal  He exhibits no mass  There is no tenderness  Musculoskeletal: Normal range of motion  Lymphadenopathy:     He has no cervical adenopathy  Neurological: He is alert  No cranial nerve deficit  Skin: Skin is warm  Psychiatric: He has a normal mood and affect

## 2020-02-20 ENCOUNTER — TELEPHONE (OUTPATIENT)
Dept: INTERNAL MEDICINE CLINIC | Facility: CLINIC | Age: 45
End: 2020-02-20

## 2020-02-20 LAB
FLUAV RNA NPH QL NAA+PROBE: ABNORMAL
FLUBV RNA NPH QL NAA+PROBE: DETECTED
RSV RNA NPH QL NAA+PROBE: ABNORMAL

## 2020-03-12 ENCOUNTER — OFFICE VISIT (OUTPATIENT)
Dept: INTERNAL MEDICINE CLINIC | Facility: CLINIC | Age: 45
End: 2020-03-12
Payer: COMMERCIAL

## 2020-03-12 VITALS
SYSTOLIC BLOOD PRESSURE: 126 MMHG | WEIGHT: 195 LBS | OXYGEN SATURATION: 97 % | HEART RATE: 75 BPM | TEMPERATURE: 98.3 F | HEIGHT: 72 IN | DIASTOLIC BLOOD PRESSURE: 90 MMHG | BODY MASS INDEX: 26.41 KG/M2

## 2020-03-12 DIAGNOSIS — E78.2 MIXED HYPERLIPIDEMIA: Primary | ICD-10-CM

## 2020-03-12 DIAGNOSIS — I10 BENIGN ESSENTIAL HYPERTENSION: ICD-10-CM

## 2020-03-12 DIAGNOSIS — F41.1 GAD (GENERALIZED ANXIETY DISORDER): ICD-10-CM

## 2020-03-12 DIAGNOSIS — Z11.4 SCREENING FOR HIV (HUMAN IMMUNODEFICIENCY VIRUS): ICD-10-CM

## 2020-03-12 DIAGNOSIS — N52.9 ERECTILE DYSFUNCTION, UNSPECIFIED ERECTILE DYSFUNCTION TYPE: ICD-10-CM

## 2020-03-12 PROBLEM — J02.8 SORE THROAT (VIRAL): Status: RESOLVED | Noted: 2019-09-16 | Resolved: 2020-03-12

## 2020-03-12 PROBLEM — Z13.31 POSITIVE DEPRESSION SCREENING: Status: RESOLVED | Noted: 2019-05-07 | Resolved: 2020-03-12

## 2020-03-12 PROBLEM — B97.89 SORE THROAT (VIRAL): Status: RESOLVED | Noted: 2019-09-16 | Resolved: 2020-03-12

## 2020-03-12 PROCEDURE — 3008F BODY MASS INDEX DOCD: CPT | Performed by: NURSE PRACTITIONER

## 2020-03-12 PROCEDURE — 3080F DIAST BP >= 90 MM HG: CPT | Performed by: NURSE PRACTITIONER

## 2020-03-12 PROCEDURE — 1036F TOBACCO NON-USER: CPT | Performed by: NURSE PRACTITIONER

## 2020-03-12 PROCEDURE — 99213 OFFICE O/P EST LOW 20 MIN: CPT | Performed by: NURSE PRACTITIONER

## 2020-03-12 PROCEDURE — 3074F SYST BP LT 130 MM HG: CPT | Performed by: NURSE PRACTITIONER

## 2020-03-12 RX ORDER — ALPRAZOLAM 0.5 MG/1
0.5 TABLET ORAL DAILY PRN
Qty: 10 TABLET | Refills: 0 | Status: SHIPPED | OUTPATIENT
Start: 2020-03-12 | End: 2020-09-01 | Stop reason: SDUPTHER

## 2020-03-12 NOTE — PROGRESS NOTES
Assessment/Plan:    CONNER (generalized anxiety disorder)  Patient reports he may not his Lexapro on his own, patient continues to use Xanax very sparingly  Discussed possibly adding BuSpar however patient does not want to start a new medication at this time  Patient currently following Psychology and will be setting up an appointment with Psychiatry  Plan on following up with patient in approximately 6 months for annual physical     Male erectile dysfunction  Patient currently following urology  Mixed hyperlipidemia    Will get updated lipid panel  Recommend healthy lifestyle choices for your cholesterol  Low fat/low cholesterol diet  Limit/avoid red meat  Eat more lean meat - chicken breast, ground turkey, fish  Exercise 30 mins at least 5 times a week as tolerated  BMI Counseling: Body mass index is 26 45 kg/m²  The BMI is above normal  Nutrition recommendations include decreasing portion sizes, encouraging healthy choices of fruits and vegetables, decreasing fast food intake, consuming healthier snacks, limiting drinks that contain sugar, moderation in carbohydrate intake, increasing intake of lean protein, reducing intake of saturated and trans fat and reducing intake of cholesterol  Exercise recommendations include moderate physical activity 150 minutes/week and exercising 3-5 times per week  Depression Screening and Follow-up Plan: Continue regular follow-up with their mental health provider who is managing their mental health condition(s)  Diagnoses and all orders for this visit:    Mixed hyperlipidemia  -     Lipid panel    CONNER (generalized anxiety disorder)  -     ALPRAZolam (XANAX) 0 5 mg tablet; Take 1 tablet (0 5 mg total) by mouth daily as needed for anxiety (for severe anxiety)  -     Ambulatory referral to Psychiatry; Future    Benign essential hypertension  -     Comprehensive metabolic panel;  Future  -     CBC and differential    Screening for HIV (human immunodeficiency virus)  -     HIV 1/2 Antigen/Antibody (4th Generation) w Reflex [facility]; Future    Erectile dysfunction, unspecified erectile dysfunction type          Subjective:      Patient ID: Logan Gentile is a 38 y o  male  Patient presents today to follow-up on anxiety/depression  Patient reports that he weaned himself off of Lexapro on his own  Patient currently sees Psychology once every 2 weeks, and is looking into establishing care with psychiatry  Patient using Xanax very sparingly  Patient reports that he recently on visit has been doing with his anxiety depression with coping mechanisms and feels pretty good, he reports no recent panic attacks  Note from previous office visit:  Pt is here today to discuss changes to his anxiety medication  Pt is currently on Lexapro 20 mg  Pt decreased his dose of Lexapro to 15 mg about 7 days  Pt reports a lot of side effects with the 20 mg such as diarrhea (intermittent for 2 and a half weeks), feeling disconnected for reality/life in general, difficulty concentrating, and more tired and fatigued  Pt reports he is sleeping normally getting 7-8 hours of sleep  He reports just feeling more sluggish and tired especially when waking up in the morning  Pt states his anxiety symptoms have been stable on this dose  He reports the "up and down" emotions and panic attacks he was having since October have resolved  Pt states he felt more comfortable at his prior dose of Lexapro 10 mg and would like to try that dose again before completely switching medications  Pt states he has felt better since decreasing his dose to 15 mg  Pt denies any new stressors  Pt denies any depressive thoughts   Denies suicidal or homicidal ideations           The following portions of the patient's history were reviewed and updated as appropriate: allergies, current medications, past family history, past medical history, past social history, past surgical history and problem list     Review of Systems   Constitutional: Negative for activity change, appetite change, chills, diaphoresis and fever  HENT: Negative for congestion, ear discharge, ear pain, postnasal drip, rhinorrhea, sinus pressure, sinus pain and sore throat  Eyes: Negative for pain, discharge, itching and visual disturbance  Respiratory: Negative for cough, chest tightness, shortness of breath and wheezing  Cardiovascular: Negative for chest pain, palpitations and leg swelling  Gastrointestinal: Negative for abdominal pain, constipation, diarrhea, nausea and vomiting  Endocrine: Negative for polydipsia, polyphagia and polyuria  Genitourinary: Negative for difficulty urinating, dysuria and urgency  Musculoskeletal: Negative for arthralgias, back pain and neck pain  Skin: Negative for rash and wound  Neurological: Negative for dizziness, weakness, numbness and headaches  Psychiatric/Behavioral: The patient is nervous/anxious            Past Medical History:   Diagnosis Date    Anxiety     Benign essential hypertension 11/20/2009    BPH with obstruction/lower urinary tract symptoms 2015    Cervicalgia     Corporo-venous occlusive erectile dysfunction 2017    Epididymitis 2017    Erectile dysfunction 2014    Hypertension     Kidney stone     OAB (overactive bladder)     Positive depression screening 5/7/2019    Sore throat (viral) 9/16/2019    Urge incontinence 2014    Urgency of urination 2017         Current Outpatient Medications:     ALPRAZolam (XANAX) 0 5 mg tablet, Take 1 tablet (0 5 mg total) by mouth daily as needed for anxiety (for severe anxiety), Disp: 10 tablet, Rfl: 0    Ascorbic Acid (VITAMIN C) 1000 MG tablet, Take 1,000 mg by mouth daily, Disp: , Rfl:     B Complex Vitamins (B COMPLEX 1 PO), Take by mouth daily , Disp: , Rfl:     Cholecalciferol (VITAMIN D) 2000 units CAPS, Take 1 capsule by mouth daily, Disp: , Rfl:     Coenzyme Q10 (COQ10) 100 MG CAPS, Take 1 capsule by mouth daily, Disp: , Rfl:     Omega-3 Fatty Acids (FISH OIL) 1000 MG CPDR, Take by mouth daily , Disp: , Rfl:     tadalafil (CIALIS) 20 MG tablet, Take 1 tablet (20 mg total) by mouth as needed for erectile dysfunction, Disp: 10 tablet, Rfl: prn    Zinc 100 MG TABS, Take 1 tablet by mouth daily, Disp: , Rfl:     No Known Allergies    Social History   Past Surgical History:   Procedure Laterality Date    HERNIA REPAIR      VASECTOMY       Family History   Problem Relation Age of Onset    Lung cancer Mother     Lung cancer Father     Cancer Maternal Grandmother         unknown    Heart disease Maternal Grandfather     Prostate cancer Maternal Grandfather        Objective:  /90 (BP Location: Left arm, Patient Position: Sitting, Cuff Size: Adult)   Pulse 75   Temp 98 3 °F (36 8 °C) (Oral)   Ht 6' (1 829 m)   Wt 88 5 kg (195 lb) Comment: w shoes  SpO2 97% Comment: ra  BMI 26 45 kg/m²     Recent Results (from the past 1344 hour(s))   Influenza A/B and RSV PCR    Collection Time: 02/19/20 11:58 AM   Result Value Ref Range    INFLUENZA A PCR None Detected None Detected    INFLUENZA B PCR Detected (A) None Detected    RSV PCR None Detected None Detected            Physical Exam   Constitutional: He is oriented to person, place, and time  He appears well-developed and well-nourished  No distress  HENT:   Head: Normocephalic and atraumatic  Right Ear: External ear normal    Left Ear: External ear normal    Nose: Nose normal    Mouth/Throat: Oropharynx is clear and moist  No oropharyngeal exudate  Eyes: Pupils are equal, round, and reactive to light  Conjunctivae and EOM are normal  Right eye exhibits no discharge  Left eye exhibits no discharge  Neck: Normal range of motion  Neck supple  No thyromegaly present  Cardiovascular: Normal rate, regular rhythm, normal heart sounds and intact distal pulses  Exam reveals no gallop and no friction rub  No murmur heard    Pulmonary/Chest: Effort normal and breath sounds normal  No stridor  No respiratory distress  He has no wheezes  He has no rales  Abdominal: Soft  Bowel sounds are normal  He exhibits no distension  There is no tenderness  Lymphadenopathy:     He has no cervical adenopathy  Neurological: He is alert and oriented to person, place, and time  Skin: Skin is warm and dry  No rash noted  He is not diaphoretic  No erythema  Psychiatric: He has a normal mood and affect   His behavior is normal  Judgment and thought content normal

## 2020-03-12 NOTE — ASSESSMENT & PLAN NOTE
Will get updated lipid panel  Recommend healthy lifestyle choices for your cholesterol  Low fat/low cholesterol diet  Limit/avoid red meat  Eat more lean meat - chicken breast, ground turkey, fish  Exercise 30 mins at least 5 times a week as tolerated

## 2020-03-12 NOTE — ASSESSMENT & PLAN NOTE
Patient reports he may not his Lexapro on his own, patient continues to use Xanax very sparingly  Discussed possibly adding BuSpar however patient does not want to start a new medication at this time  Patient currently following Psychology and will be setting up an appointment with Psychiatry    Plan on following up with patient in approximately 6 months for annual physical

## 2020-04-03 ENCOUNTER — TELEPHONE (OUTPATIENT)
Dept: PSYCHIATRY | Facility: CLINIC | Age: 45
End: 2020-04-03

## 2020-04-17 ENCOUNTER — TELEMEDICINE (OUTPATIENT)
Dept: PSYCHIATRY | Facility: CLINIC | Age: 45
End: 2020-04-17
Payer: COMMERCIAL

## 2020-04-17 DIAGNOSIS — F41.1 GAD (GENERALIZED ANXIETY DISORDER): Primary | ICD-10-CM

## 2020-04-17 PROCEDURE — 99205 OFFICE O/P NEW HI 60 MIN: CPT | Performed by: NURSE PRACTITIONER

## 2020-04-17 RX ORDER — BUSPIRONE HYDROCHLORIDE 15 MG/1
15 TABLET ORAL 2 TIMES DAILY
Qty: 60 TABLET | Refills: 1 | Status: SHIPPED | OUTPATIENT
Start: 2020-04-17 | End: 2020-05-11

## 2020-05-09 DIAGNOSIS — F41.1 GAD (GENERALIZED ANXIETY DISORDER): ICD-10-CM

## 2020-05-11 RX ORDER — BUSPIRONE HYDROCHLORIDE 15 MG/1
TABLET ORAL
Qty: 60 TABLET | Refills: 1 | Status: SHIPPED | OUTPATIENT
Start: 2020-05-11 | End: 2020-05-20

## 2020-05-20 ENCOUNTER — TELEMEDICINE (OUTPATIENT)
Dept: PSYCHIATRY | Facility: CLINIC | Age: 45
End: 2020-05-20
Payer: COMMERCIAL

## 2020-05-20 DIAGNOSIS — F41.1 GAD (GENERALIZED ANXIETY DISORDER): ICD-10-CM

## 2020-05-20 PROCEDURE — NC001 PR NO CHARGE: Performed by: NURSE PRACTITIONER

## 2020-05-20 PROCEDURE — 99214 OFFICE O/P EST MOD 30 MIN: CPT | Performed by: NURSE PRACTITIONER

## 2020-05-20 PROCEDURE — 90833 PSYTX W PT W E/M 30 MIN: CPT | Performed by: NURSE PRACTITIONER

## 2020-05-20 RX ORDER — BUSPIRONE HYDROCHLORIDE 7.5 MG/1
7.5 TABLET ORAL 2 TIMES DAILY
Start: 2020-05-20 | End: 2020-06-10

## 2020-06-10 ENCOUNTER — TELEMEDICINE (OUTPATIENT)
Dept: PSYCHIATRY | Facility: CLINIC | Age: 45
End: 2020-06-10
Payer: COMMERCIAL

## 2020-06-10 DIAGNOSIS — F41.1 GAD (GENERALIZED ANXIETY DISORDER): Primary | ICD-10-CM

## 2020-06-10 PROCEDURE — 99213 OFFICE O/P EST LOW 20 MIN: CPT | Performed by: NURSE PRACTITIONER

## 2020-06-10 RX ORDER — BUSPIRONE HYDROCHLORIDE 7.5 MG/1
7.5 TABLET ORAL 2 TIMES DAILY
Qty: 60 TABLET | Refills: 3 | Status: SHIPPED | OUTPATIENT
Start: 2020-06-10 | End: 2020-09-08

## 2020-08-05 ENCOUNTER — OFFICE VISIT (OUTPATIENT)
Dept: INTERNAL MEDICINE CLINIC | Facility: CLINIC | Age: 45
End: 2020-08-05
Payer: COMMERCIAL

## 2020-08-05 VITALS
HEART RATE: 74 BPM | SYSTOLIC BLOOD PRESSURE: 130 MMHG | HEIGHT: 72 IN | OXYGEN SATURATION: 96 % | DIASTOLIC BLOOD PRESSURE: 84 MMHG | WEIGHT: 193 LBS | BODY MASS INDEX: 26.14 KG/M2 | TEMPERATURE: 97.4 F

## 2020-08-05 DIAGNOSIS — Z86.19 HISTORY OF LYME DISEASE: Primary | ICD-10-CM

## 2020-08-05 DIAGNOSIS — S89.92XA INJURY OF LEFT KNEE, INITIAL ENCOUNTER: ICD-10-CM

## 2020-08-05 PROCEDURE — 3079F DIAST BP 80-89 MM HG: CPT | Performed by: NURSE PRACTITIONER

## 2020-08-05 PROCEDURE — 99213 OFFICE O/P EST LOW 20 MIN: CPT | Performed by: NURSE PRACTITIONER

## 2020-08-05 PROCEDURE — 3725F SCREEN DEPRESSION PERFORMED: CPT | Performed by: NURSE PRACTITIONER

## 2020-08-05 PROCEDURE — 1036F TOBACCO NON-USER: CPT | Performed by: NURSE PRACTITIONER

## 2020-08-05 PROCEDURE — 3008F BODY MASS INDEX DOCD: CPT | Performed by: NURSE PRACTITIONER

## 2020-08-05 PROCEDURE — 3075F SYST BP GE 130 - 139MM HG: CPT | Performed by: NURSE PRACTITIONER

## 2020-08-05 RX ORDER — MELOXICAM 15 MG/1
15 TABLET ORAL DAILY
Qty: 30 TABLET | Refills: 1 | Status: SHIPPED | OUTPATIENT
Start: 2020-08-05 | End: 2020-12-09

## 2020-08-05 NOTE — ASSESSMENT & PLAN NOTE
Will get x-ray of left knee, will give referral to PT, patient is to take mobic 15mg tablet once daily with food  Will also give referral to orthopedics

## 2020-08-05 NOTE — PROGRESS NOTES
Assessment/Plan:    History of Lyme disease  Will get blood work  Injury of left knee  Will get x-ray of left knee, will give referral to PT, patient is to take mobic 15mg tablet once daily with food  Will also give referral to orthopedics  Diagnoses and all orders for this visit:    History of Lyme disease  -     Lyme Antibody Profile with reflex to WB; Future    Injury of left knee, initial encounter  -     XR knee 3 vw left non injury; Future  -     Ambulatory referral to Physical Therapy; Future  -     Ambulatory referral to Orthopedic Surgery; Future  -     meloxicam (MOBIC) 15 mg tablet; Take 1 tablet (15 mg total) by mouth daily          Subjective:      Patient ID: Joan Nunez is a 38 y o  male  Patient presents today with complaints of left knee pain  Patient denies any recent injury, however patient reports that approximately 10 years ago while skiing he did hear a pop in his knee, patient reports that he never had his knee evaluated  Patient also reports positive history for Lyme disease in 2006 which he was treated with doxycycline  Patient reports that lately as he is becoming more active he has noticed that his knee becomes more swollen and painful  Knee Pain    Incident onset: 10 years ago  Incident location: while skiing  The pain is present in the left knee  The quality of the pain is described as aching  The pain has been worsening since onset  Pertinent negatives include no inability to bear weight, loss of motion, loss of sensation, muscle weakness, numbness or tingling  He reports no foreign bodies present  The symptoms are aggravated by movement, palpation and weight bearing  He has tried ice for the symptoms  The treatment provided no relief         The following portions of the patient's history were reviewed and updated as appropriate: allergies, current medications, past family history, past medical history, past social history, past surgical history and problem list     Review of Systems   Constitutional: Negative for activity change, appetite change, chills, diaphoresis and fever  HENT: Negative for congestion, ear discharge, ear pain, postnasal drip, rhinorrhea, sinus pressure, sinus pain and sore throat  Eyes: Negative for pain, discharge, itching and visual disturbance  Respiratory: Negative for cough, chest tightness, shortness of breath and wheezing  Cardiovascular: Negative for chest pain, palpitations and leg swelling  Gastrointestinal: Negative for abdominal pain, constipation, diarrhea, nausea and vomiting  Endocrine: Negative for polydipsia, polyphagia and polyuria  Genitourinary: Negative for difficulty urinating, dysuria and urgency  Musculoskeletal: Positive for arthralgias (Knee pain)  Negative for back pain and neck pain  Skin: Negative for rash and wound  Neurological: Negative for dizziness, tingling, weakness, numbness and headaches           Past Medical History:   Diagnosis Date    Anxiety     Benign essential hypertension 11/20/2009    BPH with obstruction/lower urinary tract symptoms 2015    Cervicalgia     Corporo-venous occlusive erectile dysfunction 2017    Epididymitis 2017    Erectile dysfunction 2014    Hypertension     Kidney stone     OAB (overactive bladder)     Positive depression screening 5/7/2019    Sore throat (viral) 9/16/2019    Urge incontinence 2014    Urgency of urination 2017         Current Outpatient Medications:     ALPRAZolam (XANAX) 0 5 mg tablet, Take 1 tablet (0 5 mg total) by mouth daily as needed for anxiety (for severe anxiety), Disp: 10 tablet, Rfl: 0    Ascorbic Acid (VITAMIN C) 1000 MG tablet, Take 1,000 mg by mouth daily, Disp: , Rfl:     B Complex Vitamins (B COMPLEX 1 PO), Take by mouth daily , Disp: , Rfl:     busPIRone (BUSPAR) 7 5 mg tablet, Take 1 tablet (7 5 mg total) by mouth 2 (two) times a day, Disp: 60 tablet, Rfl: 3    Cholecalciferol (VITAMIN D) 2000 units CAPS, Take 1 capsule by mouth daily, Disp: , Rfl:     Coenzyme Q10 (COQ10) 100 MG CAPS, Take 1 capsule by mouth daily, Disp: , Rfl:     Omega-3 Fatty Acids (FISH OIL) 1000 MG CPDR, Take by mouth daily , Disp: , Rfl:     tadalafil (CIALIS) 20 MG tablet, Take 1 tablet (20 mg total) by mouth as needed for erectile dysfunction, Disp: 10 tablet, Rfl: prn    Zinc 100 MG TABS, Take 1 tablet by mouth daily, Disp: , Rfl:     meloxicam (MOBIC) 15 mg tablet, Take 1 tablet (15 mg total) by mouth daily, Disp: 30 tablet, Rfl: 1    No Known Allergies    Social History   Past Surgical History:   Procedure Laterality Date    HERNIA REPAIR      VASECTOMY       Family History   Problem Relation Age of Onset    Lung cancer Mother     Lung cancer Father     Cancer Maternal Grandmother         unknown    Heart disease Maternal Grandfather     Prostate cancer Maternal Grandfather        Objective:  /84 (BP Location: Left arm, Patient Position: Sitting, Cuff Size: Adult)   Pulse 74   Temp (!) 97 4 °F (36 3 °C) (Temporal)   Ht 6'   Wt 87 5 kg (193 lb)   SpO2 96%   BMI 26 18 kg/m²     No results found for this or any previous visit (from the past 1344 hour(s))  Physical Exam   Constitutional: He is oriented to person, place, and time  He appears well-developed  No distress  HENT:   Head: Normocephalic and atraumatic  Right Ear: External ear normal    Left Ear: External ear normal    Nose: Nose normal    Mouth/Throat: No oropharyngeal exudate  Eyes: Pupils are equal, round, and reactive to light  Conjunctivae are normal  Right eye exhibits no discharge  Left eye exhibits no discharge  Neck: Normal range of motion  Neck supple  No thyromegaly present  Cardiovascular: Normal rate, regular rhythm and normal heart sounds  Exam reveals no gallop and no friction rub  No murmur heard  Pulmonary/Chest: Effort normal and breath sounds normal  No stridor  No respiratory distress  He has no wheezes   He has no rales    Abdominal: Soft  Bowel sounds are normal  He exhibits no distension  There is no abdominal tenderness  Musculoskeletal:      Left knee: He exhibits normal range of motion, no swelling, no effusion, no deformity, no laceration, no erythema, normal alignment, no LCL laxity, normal patellar mobility and no MCL laxity  Tenderness found  Medial joint line tenderness noted  No lateral joint line, no MCL, no LCL and no patellar tendon tenderness noted  Lymphadenopathy:     He has no cervical adenopathy  Neurological: He is alert and oriented to person, place, and time  Skin: Skin is warm and dry  No rash noted  He is not diaphoretic  No erythema     Psychiatric: His behavior is normal  Judgment and thought content normal

## 2020-08-06 ENCOUNTER — APPOINTMENT (OUTPATIENT)
Dept: RADIOLOGY | Facility: MEDICAL CENTER | Age: 45
End: 2020-08-06
Payer: COMMERCIAL

## 2020-08-06 DIAGNOSIS — S89.92XA INJURY OF LEFT KNEE, INITIAL ENCOUNTER: ICD-10-CM

## 2020-08-06 PROCEDURE — 73562 X-RAY EXAM OF KNEE 3: CPT

## 2020-08-11 ENCOUNTER — EVALUATION (OUTPATIENT)
Dept: PHYSICAL THERAPY | Age: 45
End: 2020-08-11
Payer: COMMERCIAL

## 2020-08-11 DIAGNOSIS — S89.92XD INJURY OF LEFT KNEE, SUBSEQUENT ENCOUNTER: Primary | ICD-10-CM

## 2020-08-11 PROCEDURE — 97161 PT EVAL LOW COMPLEX 20 MIN: CPT | Performed by: PHYSICAL THERAPIST

## 2020-08-11 PROCEDURE — 97110 THERAPEUTIC EXERCISES: CPT | Performed by: PHYSICAL THERAPIST

## 2020-08-11 NOTE — PROGRESS NOTES
PT Evaluation     Today's date: 2020  Patient name: Marci Leblanc  : 1975  MRN: [id number]  Referring provider: Sole Delgado , *  Dx:   Encounter Diagnosis     ICD-10-CM    1  Injury of left knee, subsequent encounter  S89  92XD Ambulatory referral to Physical Therapy       Start Time: 342  Stop Time: 1245  Total time in clinic (min): 60 minutes    Assessment  Assessment details: Patient is 37 y/o M presenting to physical therapy w/ chronic knee pain following an episode of popping & pain when skiing 10 years ago  Patient presents w/ MCL irritation as well as ACL laxity, likely due to his injury 10 years ago  Patient has decreased quad set as well as swelling in L knee in addition to some hip strength deficits compared to the unaffected leg  Patient will benefit from physical therapy for strengthening and stability exercises so that patient is able to skii, run, navigate stairs, and perform high intensity training all without pain    Impairments: abnormal muscle firing, abnormal muscle tone, impaired balance, impaired physical strength, pain with function and poor body mechanics    Symptom irritability: lowUnderstanding of Dx/Px/POC: good   Prognosis: good    Plan  Patient would benefit from: skilled speech therapy  Planned modality interventions: electrical stimulation/Russian stimulation  Planned therapy interventions: joint mobilization, manual therapy, Myers taping, motor coordination training, patient education, therapeutic activities, therapeutic exercise, strengthening, flexibility, graded exercise, home exercise program and balance  Frequency: 2x week  Duration in weeks: 8  Treatment plan discussed with: patient      Subjective Evaluation    History of Present Illness  Mechanism of injury: trauma  Mechanism of injury: Patient reports 10 years ago he was skiing and heard a pop and felt pain in his knee, [place] but was able to return to skiing w/ some pain the rest of the day  Since then, patient has pain w/ stairs and high intensity training  After running or skiing patient will have pain a few hours following and experience swelling as well, this will take a few days to diminish  Patient notes that he does have Lyme's disease that will affect his R hand and L ankle and has noticed swelling in his L knee as well due to the Lyme's before his injury  Patient notes occasional clicking and pain w/ twisting or pivoting  Patient denies any giving way, paraesthesias, numbness, tingling  This most recent flare up of pain is due to running 2 5 miles last week after taking a few weeks off, patient notes that pain and swelling have since decreased  Patient would like to do high intensity training, run, ski, and navigate stairs w/o pain  Recurrent probem    Quality of life: good    Pain  Current pain ratin  At best pain ratin  At worst pain ratin  Quality: dull ache and sharp  Relieving factors: ice and rest  Aggravating factors: walking, sitting, stair climbing and running  Progression: no change    Social Support  Steps to enter house: yes  Stairs in house: yes       Diagnostic Tests  X-ray: normal  Treatments  Current treatment: medication  Patient Goals  Patient goals for therapy: decreased pain, increased strength and return to sport/leisure activities  Patient goal: no pain with stair navigation, return to higher level exercise/activity        Objective  Lumbar: Full pain-free ROM    GAIT: WNL  Squat assess: WNL, no pain  ¼ squat assess: WNL  SLS: RLE: 10sec, LLE: 5sec           MMT    Hip         R          L   Flex  5 5   Extn  4 5   Abd  5 5   Add  5 5   IR  5 5   ER  5 5        G  Max     G  Med  4 n/t   Iliop  MMT         AROM         PROM    Knee      R          L         R          L           R         L   Flex  5 5 WNL WNL WNL WNL   Extn  5 5 WNL WNL WNL WNL                         MMT    Ankle         R          L   PF 5 5   DF  5 5   EHL 5 5   Inv  5 5   Ever  5 5       Palpation: TTP MCL       Hip: hamstring dominance test= n/t   hip abd/lat rot = +    Knee: post drawer= - Lachmans test= + anterior draw test= +   Valgus stress test= + varus stress test = -   Candelario test   = -   Thessaly= -   Effusion= 2+  Flowsheet Rows      Most Recent Value   PT/OT G-Codes   Current Score  54   Projected Score  82        Precautions: HTN, hx of Lyme Disease (left ankle and right hand pain)    Manuals                                    Neuro Re-Ed 8/11                                                                       Ther Ex 8/11        S/l hip abd x10x5"        S/l hip add x10        Prone hip ext x10x5"        Supine SLR x10x5"        Standing hip abd- L stance X45"- GTB        Standing hip add- L stance X45"- GTB        Pt edu  Ther Activity                           Gait Training                           Modalities                             Short Term:  1  Pt will report decreased levels of pain by at least 2 subjective ratings in 4 weeks  2  Pt will demonstrate improved strength by 1/2 grade in 4 weeks  3  Pt will be able to navigate stairs w/o pain in 4 weeks  Long Term:   1  Pt will be independent in their HEP in 8 weeks  2  Pt will be pain free with IADL's in 8 weeks  3  Pt will demonstrate improved FOTO, > 28 in 8 weeks    4  Pt will be able to run 1 mile w/o pain in 8 weeks

## 2020-08-13 ENCOUNTER — OFFICE VISIT (OUTPATIENT)
Dept: PHYSICAL THERAPY | Age: 45
End: 2020-08-13
Payer: COMMERCIAL

## 2020-08-13 DIAGNOSIS — S89.92XD INJURY OF LEFT KNEE, SUBSEQUENT ENCOUNTER: Primary | ICD-10-CM

## 2020-08-13 PROCEDURE — 97110 THERAPEUTIC EXERCISES: CPT | Performed by: PHYSICAL THERAPIST

## 2020-08-13 PROCEDURE — 97112 NEUROMUSCULAR REEDUCATION: CPT | Performed by: PHYSICAL THERAPIST

## 2020-08-13 NOTE — PROGRESS NOTES
Daily Note     Today's date: 2020  Patient name: Author Pleitez  : 1975  MRN: [id number]  Referring provider: Solomon Watt , *  Dx:   Encounter Diagnosis     ICD-10-CM    1  Injury of left knee, subsequent encounter  S89  92XD        Start Time: 1400  Stop Time: 1450  Total time in clinic (min): 50 minutes    Subjective: Patient reports some increased pain today due to exercises but is able to function normally  Objective: See treatment diary below  Patient co-treated by Physical Therapy Student, Sunitha Chen, under my direct supervision  Assessment: Tolerated treatment fair  Patient demonstrated fatigue post treatment  Required cueing to avoid knee valgus/varus  Focused on hip and glute strengthening as well as balance and stability  Pain w/ lateral step down so regressed to heel taps on level ground  Plan: Progress treatment as tolerated  Precautions: HTN, hx of Lyme Disease (left ankle and right hand pain)     Manuals                                                               Neuro Re-Ed             squat on bosu    1*10, 12"            SL w/ rebounder    yellow 4x20;  M/L red 2x20 ea            Standing hip abd- L stance    3x10 rtb                                                                           Ther Ex            S/l hip abd x10x5"  x10x5"           S/l hip add x10  x10x3"           Prone hip ext x10x5"  x10x5"           Supine SLR x10x5"  x10x5"           Standing hip abd- L stance X45"- GTB  HOLD           Standing hip add- L stance X45"- GTB             Pt edu                 vigor gym    no base lvl 10  3x25, SL lvl 8 3x15            Side stepping  ytb 5x20' ea       Heel tap  lvl ground 2x10                Ther Activity                                               Gait Training                                               Modalities

## 2020-08-18 ENCOUNTER — OFFICE VISIT (OUTPATIENT)
Dept: PHYSICAL THERAPY | Age: 45
End: 2020-08-18
Payer: COMMERCIAL

## 2020-08-18 DIAGNOSIS — S89.92XD INJURY OF LEFT KNEE, SUBSEQUENT ENCOUNTER: Primary | ICD-10-CM

## 2020-08-18 PROCEDURE — 97110 THERAPEUTIC EXERCISES: CPT

## 2020-08-18 PROCEDURE — 97112 NEUROMUSCULAR REEDUCATION: CPT

## 2020-08-18 NOTE — PROGRESS NOTES
Daily Note     Today's date: 2020  Patient name: Ronnie Galvin  : 1975  MRN: [id number]  Referring provider: Willard Muñiz , *  Dx:   Encounter Diagnosis     ICD-10-CM    1  Injury of left knee, subsequent encounter  S89  92XD                   Subjective:  Pt reports inconsistent sx depending on his activities, pt reports dull pain, not sharp pain after there ex      Objective: See treatment diary below      Assessment:  Progressed ex as nighat, increased difficulty noted with increased fatigue, min swelling noted L knee, decreased neuromuscular control noted with CKC heel taps on level  Plan: Continue per plan of care  Progress treatment as tolerated  Precautions: HTN, hx of Lyme Disease (left ankle and right hand pain)     Manuals                                                               Neuro Re-Ed 20          squat on bosu    1*10, 12"            SL w/ rebounder    yellow 4x20;  M/L red 2x20 ea  on Airex yellow ball  3x30; M/L red ball 3x30 ea           Standing hip abd- L stance    3x10 rtb                                                                           Ther Ex 20         S/l hip abd x10x5"  x10x5"           S/l hip add x10  x10x3"           Prone hip ext x10x5"  x10x5"  EOB 10x5"         Supine SLR x10x5"  x10x5"  10x5"         Standing hip abd- L stance X45"- GTB  HOLD  gtb 3x10         Standing hip add- L stance X45"- GTB            Pt edu                 vigor gym    no base lvl 10  3x25, SL lvl 8 3x15   lvl 10 3x25; single leg lvl 8 3x15         Side stepping  ytb 5x20' ea ytb 6x20'      Heel tap  lvl ground 2x10 lvl 3x10               Ther Activity                                               Gait Training                                               Modalities

## 2020-08-20 ENCOUNTER — OFFICE VISIT (OUTPATIENT)
Dept: PHYSICAL THERAPY | Age: 45
End: 2020-08-20
Payer: COMMERCIAL

## 2020-08-20 DIAGNOSIS — S89.92XD INJURY OF LEFT KNEE, SUBSEQUENT ENCOUNTER: Primary | ICD-10-CM

## 2020-08-20 PROCEDURE — 97112 NEUROMUSCULAR REEDUCATION: CPT

## 2020-08-20 PROCEDURE — 97110 THERAPEUTIC EXERCISES: CPT

## 2020-08-20 NOTE — PROGRESS NOTES
Daily Note     Today's date: 2020  Patient name: Jaqueline Cortez  : 1975  MRN: [id number]  Referring provider: Rafael Castañeda , *  Dx:   Encounter Diagnosis     ICD-10-CM    1  Injury of left knee, subsequent encounter  S89  92XD                   Subjective:  Pt reports less pain L lateral knee, pt states he wants to try progressing steps today if able       Objective: See treatment diary below      Assessment:  Added leg press to help progress gait on steps, eccentric lowering, good neuromuscular control noted with 4 " step down, no significant valgus/amy noted      Plan: Continue per plan of care  Progress treatment as tolerated  Progress SL leg press as able         Precautions: HTN, hx of Lyme Disease (left ankle and right hand pain)     Manuals                                                               Neuro Re-Ed 20        squat on bosu    1*10, 12"            SL w/ rebounder    yellow 4x20;  M/L red 2x20 ea  on Airex yellow ball  3x30; M/L red ball 3x30 ea   airex, yellow ball 3x30; M/L red ball 3x30        Standing hip abd- L stance    3x10 rtb                                                                           Ther Ex 20       S/l hip abd x10x5"  x10x5"           S/l hip add x10  x10x3"           Prone hip ext x10x5"  x10x5"  EOB 10x5"  EOB 10x5"       Supine SLR x10x5"  x10x5"  10x5"  10x5"       Standing hip abd- L stance X45"- GTB  HOLD  gtb 3x10         Standing hip add- L stance X45"- GTB            Pt edu                 vigor gym    no base lvl 10  3x25, SL lvl 8 3x15   lvl 10 3x25; single leg lvl 8 3x15  lvl 10 3x25; single leg lvl 8 3x15       Side stepping  ytb 5x20' ea ytb 6x20' gtb 6x20'     Heel tap  lvl ground 2x10 lvl 3x10 4" step 3x10     Leg press BL    85# 3x10     Leg press SLS    35# 3x10     Ther Activity                                               Gait Training                                               Modalities                                                 alert/awake/oriented to person, place, time/situation

## 2020-08-25 ENCOUNTER — APPOINTMENT (OUTPATIENT)
Dept: PHYSICAL THERAPY | Age: 45
End: 2020-08-25
Payer: COMMERCIAL

## 2020-08-27 ENCOUNTER — OFFICE VISIT (OUTPATIENT)
Dept: PHYSICAL THERAPY | Age: 45
End: 2020-08-27
Payer: COMMERCIAL

## 2020-08-27 DIAGNOSIS — S89.92XD INJURY OF LEFT KNEE, SUBSEQUENT ENCOUNTER: Primary | ICD-10-CM

## 2020-08-27 PROCEDURE — 97110 THERAPEUTIC EXERCISES: CPT | Performed by: PHYSICAL THERAPIST

## 2020-08-27 NOTE — PROGRESS NOTES
Daily Note     Today's date: 2020  Patient name: Joan Nunez  : 1975  MRN: [id number]  Referring provider: Voncile Goltz , *  Dx:   Encounter Diagnosis     ICD-10-CM    1  Injury of left knee, subsequent encounter  S89  92XD                   Subjective:  Patient reports twisting while throwing his daughter in the pool this weekend and having lateral and medial joint line pain since  Patient also reports sensation of instability w/ walking and feeling like his knee will give out but it doesn't  Patient notes increased clicking since this incident  Objective: See treatment diary below  Meniscal testing: Thessaly: + L, - R  Joint line tenderness: + med, lat L, - R  Candelario's Test: + L, - R  Pain w/ OP flexion ROM on L  Decreased quad set on L, no quad lag    Assessment:  Due to meniscal irritation, focused more on OKC strengthening rather than CKC  Patient has appointment w/ ortho tomorrow  Advised patient to continue with open chain exercises and step ups as long as this is not painful at home but to discontinue CKC band exercises  Also worked on stability & balance today  Patient reported quad fatigue post treatment  Patient co-treated by Physical Therapy Student, Brianne Rg, under my direct supervision  Plan: Continue per plan of care  Progress treatment as tolerated  Precautions: HTN, hx of Lyme Disease (left ankle and right hand pain)     Manuals                                                               Neuro Re-Ed 20      mini squat on bosu    1*10, 12"      10x10" w/ perturbations      SL w/ rebounder    yellow 4x20;  M/L red 2x20 ea  on Airex yellow ball  3x30; M/L red ball 3x30 ea   airex, yellow ball 3x30; M/L red ball 3x30        Standing hip abd- L stance    3x10 rtb                                                                           Ther Ex 20     S/l hip abd x10x5"  x10x5"      HEP     S/l hip add x10  x10x3"      HEP     Prone hip ext x10x5"  x10x5"  EOB 10x5"  EOB 10x5"  EOB 2x10x5"     Supine SLR x10x5"  x10x5"  10x5"  10x5"  3x10x5"     Standing hip abd- L stance X45"- GTB  HOLD  gtb 3x10         Standing hip add- L stance X45"- GTB            Pt edu                 vigor gym    no base lvl 10  3x25, SL lvl 8 3x15   lvl 10 3x25; single leg lvl 8 3x15  lvl 10 3x25; single leg lvl 8 3x15  single leg lvl 8 3x30     Side stepping  ytb 5x20' ea ytb 6x20' gtb 6x20' HELD    Heel tap  lvl ground 2x10 lvl 3x10 4" step 3x10 lvl 3x10    Leg press BL    85# 3x10     Leg press SLS    35# 3x10     LAQ     gtb 3x12x5"    Ther Activity                                               Gait Training                                               Modalities

## 2020-08-28 ENCOUNTER — CONSULT (OUTPATIENT)
Dept: OBGYN CLINIC | Facility: MEDICAL CENTER | Age: 45
End: 2020-08-28
Payer: COMMERCIAL

## 2020-08-28 VITALS
SYSTOLIC BLOOD PRESSURE: 138 MMHG | HEIGHT: 72 IN | DIASTOLIC BLOOD PRESSURE: 85 MMHG | HEART RATE: 80 BPM | BODY MASS INDEX: 26.95 KG/M2 | WEIGHT: 199 LBS

## 2020-08-28 DIAGNOSIS — S89.92XA INJURY OF LEFT KNEE, INITIAL ENCOUNTER: ICD-10-CM

## 2020-08-28 DIAGNOSIS — M23.92 INTERNAL DERANGEMENT OF LEFT KNEE: Primary | ICD-10-CM

## 2020-08-28 PROCEDURE — 99203 OFFICE O/P NEW LOW 30 MIN: CPT | Performed by: ORTHOPAEDIC SURGERY

## 2020-08-28 PROCEDURE — 3008F BODY MASS INDEX DOCD: CPT | Performed by: ORTHOPAEDIC SURGERY

## 2020-08-28 PROCEDURE — 1036F TOBACCO NON-USER: CPT | Performed by: ORTHOPAEDIC SURGERY

## 2020-08-28 PROCEDURE — 3079F DIAST BP 80-89 MM HG: CPT | Performed by: ORTHOPAEDIC SURGERY

## 2020-08-28 PROCEDURE — 3075F SYST BP GE 130 - 139MM HG: CPT | Performed by: ORTHOPAEDIC SURGERY

## 2020-08-28 NOTE — PROGRESS NOTES
38 y o male presents for evaluation of an injury to his left knee from 10 years ago that occurred while skiing  The 1st went abnormality felt a pop and was able to continue to ski however  Since then he describes intermittent giving way and occasional locking events  This occurs despite attending physical therapy promote return of motion and strength to his left knee      Review of Systems  Review of systems negative unless otherwise specified in HPI    Past Medical History  Past Medical History:   Diagnosis Date    Anxiety     Benign essential hypertension 11/20/2009    BPH with obstruction/lower urinary tract symptoms 2015    Cervicalgia     Corporo-venous occlusive erectile dysfunction 2017    Epididymitis 2017    Erectile dysfunction 2014    Hypertension     Kidney stone     OAB (overactive bladder)     Positive depression screening 5/7/2019    Sore throat (viral) 9/16/2019    Urge incontinence 2014    Urgency of urination 2017       Past Surgical History  Past Surgical History:   Procedure Laterality Date    HERNIA REPAIR      VASECTOMY         Current Medications  Current Outpatient Medications on File Prior to Visit   Medication Sig Dispense Refill    ALPRAZolam (XANAX) 0 5 mg tablet Take 1 tablet (0 5 mg total) by mouth daily as needed for anxiety (for severe anxiety) 10 tablet 0    Ascorbic Acid (VITAMIN C) 1000 MG tablet Take 1,000 mg by mouth daily      B Complex Vitamins (B COMPLEX 1 PO) Take by mouth daily       busPIRone (BUSPAR) 7 5 mg tablet Take 1 tablet (7 5 mg total) by mouth 2 (two) times a day 60 tablet 3    Cholecalciferol (VITAMIN D) 2000 units CAPS Take 1 capsule by mouth daily      Coenzyme Q10 (COQ10) 100 MG CAPS Take 1 capsule by mouth daily      meloxicam (MOBIC) 15 mg tablet Take 1 tablet (15 mg total) by mouth daily 30 tablet 1    Omega-3 Fatty Acids (FISH OIL) 1000 MG CPDR Take by mouth daily       tadalafil (CIALIS) 20 MG tablet Take 1 tablet (20 mg total) by mouth as needed for erectile dysfunction 10 tablet prn    Zinc 100 MG TABS Take 1 tablet by mouth daily       No current facility-administered medications on file prior to visit  Recent Labs [facility] M[facility])  0   Lab Value Date/Time    HCT 49 0 05/08/2019 0754    HGB 16 5 05/08/2019 0754    WBC 6 42 05/08/2019 0754    GLUCOSE 96 11/07/2015 1029         Physical exam  · General: Awake, Alert, Oriented  · Eyes: Pupils equal, round and reactive to light  · Heart: regular rate and rhythm  · Lungs: No audible wheezing  · Abdomen: soft  Examination finds a well developed left quadriceps  His gait pattern is without quad avoidance  His left hip moves well  Left thigh has no atrophy  Left knee is neutral slight varus  There is no effusion  There is no bony enlargement but there is tenderness on the medial joint line  There is a positive Lachman's maneuver  I am unable to appreciate a pivot shift exam   There is a positive Candelario's exam medially  There are no popliteal masses  Calf compartments left soft and supple  Toes on left foot are warm, sensate, mobile    Imaging  I personally reviewed x-rays left knee my interpretation follows: No fracture, dislocation, joint space narrowing seen    Procedure  None indicated or performed today    Assessment/Plan:   44 y o male who has evidence of internal range left knee  With his history of pop 10 years ago and ongoing instability events, in all likelihood he may have a ACL tear  In addition his Candelario's exam along the medial compartment may indicate interval development of medial meniscus to tear  The diagnosis was discussed the patient  Treatment options will be deferred until an MRI scan is performed    An MRI soft diagnostic purposes, and after the MRI scan he will follow up with 1 of the sports medicine arthroscopic surgeons in the practice

## 2020-09-01 ENCOUNTER — OFFICE VISIT (OUTPATIENT)
Dept: PHYSICAL THERAPY | Age: 45
End: 2020-09-01
Payer: COMMERCIAL

## 2020-09-01 DIAGNOSIS — F41.1 GAD (GENERALIZED ANXIETY DISORDER): ICD-10-CM

## 2020-09-01 DIAGNOSIS — S89.92XD INJURY OF LEFT KNEE, SUBSEQUENT ENCOUNTER: Primary | ICD-10-CM

## 2020-09-01 PROCEDURE — 97110 THERAPEUTIC EXERCISES: CPT | Performed by: PHYSICAL THERAPIST

## 2020-09-01 PROCEDURE — 97112 NEUROMUSCULAR REEDUCATION: CPT | Performed by: PHYSICAL THERAPIST

## 2020-09-01 NOTE — PROGRESS NOTES
Daily Note     Today's date: 2020  Patient name: Suad Talley  : 1975  MRN: [id number]  Referring provider: Geni Barnhart , *  Dx:   Encounter Diagnosis     ICD-10-CM    1  Injury of left knee, subsequent encounter  S89  92XD        Start Time:   Stop Time: 1300  Total time in clinic (min): 45 minutes    Subjective:  Patient reports he saw the doctor and he was in pain following this but is feeling less pain today  Patient reports pain w/ kicking a soccer ball yesterday  Patient would like to continue w/ PT as he feels better when he leaves  MRI to be performed next week  Objective: See treatment diary below    Assessment:  Patient had pain w/ heel taps so held on this today  Continued w/ focus on open chain strengthening as patient has discomfort w/ closed chain exercises  Patient demonstrated fatigue following treatment  Initiated hamstring strengthening to prevent anterior translation of tibia  Next visit will progress LAQ & hamstring curls as able  Progressed balance exercises as pt was able w/o discomfort  Patient co-treated by Physical Therapy Student, Danika Nolasco, under my direct supervision  Plan: Continue per plan of care  Progress treatment as tolerated  Precautions: HTN, hx of Lyme Disease (left ankle and right hand pain)     Manuals                                                               Neuro Re-Ed 20    mini squat on bosu    1*10, 12"      10x10" w/ perturbations  10x10" w/ perturbations    SL w/ rebounder    yellow 4x20;  M/L red 2x20 ea  on Airex yellow ball  3x30; M/L red ball 3x30 ea   airex, yellow ball 3x30; M/L red ball 3x30    lvl ground yellow ball 2x20, foam blue ball 2x20    Standing hip abd- L stance    3x10 rtb                                                                           Ther Ex 20   S/l hip abd x10x5"  x10x5"      HEP     S/l hip add x10  x10x3"      HEP     Prone hip ext x10x5"  x10x5"  EOB 10x5"  EOB 10x5"  EOB 2x10x5"  EOB 3x10x5"   Supine SLR x10x5"  x10x5"  10x5"  10x5"  3x10x5"  3x10x7"   Standing hip abd- L stance X45"- GTB  HOLD  gtb 3x10         Standing hip add- L stance X45"- GTB            Pt edu                 vigor gym    no base lvl 10  3x25, SL lvl 8 3x15   lvl 10 3x25; single leg lvl 8 3x15  lvl 10 3x25; single leg lvl 8 3x15  single leg lvl 8 3x30  lvl 10 B 5'   Side stepping  ytb 5x20' ea ytb 6x20' gtb 6x20' HELD    Heel tap  lvl ground 2x10 lvl 3x10 4" step 3x10 lvl 3x10 held   Leg press BL    85# 3x10     Leg press SLS    35# 3x10     LAQ     gtb 3x12x5" Blue tb 3x12x5"   Hamstring curls      Blue tb 3x10   Ther Activity                                               Gait Training                                               Modalities

## 2020-09-02 RX ORDER — ALPRAZOLAM 0.5 MG/1
0.5 TABLET ORAL DAILY PRN
Qty: 15 TABLET | Refills: 0 | Status: SHIPPED | OUTPATIENT
Start: 2020-09-02 | End: 2021-08-11 | Stop reason: SDUPTHER

## 2020-09-03 ENCOUNTER — OFFICE VISIT (OUTPATIENT)
Dept: PHYSICAL THERAPY | Age: 45
End: 2020-09-03
Payer: COMMERCIAL

## 2020-09-03 DIAGNOSIS — S89.92XD INJURY OF LEFT KNEE, SUBSEQUENT ENCOUNTER: Primary | ICD-10-CM

## 2020-09-03 PROCEDURE — 97112 NEUROMUSCULAR REEDUCATION: CPT

## 2020-09-03 PROCEDURE — 97110 THERAPEUTIC EXERCISES: CPT

## 2020-09-03 NOTE — PROGRESS NOTES
Daily Note     Today's date: 9/3/2020  Patient name: Emi Simmonds  : 1975  MRN: [id number]  Referring provider: Jamir Silva , *  Dx:   Encounter Diagnosis     ICD-10-CM    1  Injury of left knee, subsequent encounter  S89  92XD                   Subjective:  Pt reports L knee feels about the same, "It feels ok unless I increase try to increase my activity, then it hurts " pt reports he has MRI scheduled 20      Objective: See treatment diary below      Assessment:  Avoided closed chain ex that increased pt's pain, progressed LAS and hams to black theraband, pt able to perform SLS ex in straight plane, but pain with multidirectional SLS      Plan: Continue per plan of care  Progress treatment as tolerated  Precautions: HTN, hx of Lyme Disease (left ankle and right hand pain)     Manuals                                                                   Neuro Re-Ed 8/11  8/13  8/17/20  8/20/20  8/27  9/1 9/3/20    mini squat on bosu    1*10, 12"      10x10" w/ perturbations  10x10" w/ perturbations 10x10" w/pertubations     SL w/ rebounder    yellow 4x20;  M/L red 2x20 ea  on Airex yellow ball  3x30; M/L red ball 3x30 ea   airex, yellow ball 3x30; M/L red ball 3x30    lvl ground yellow ball 2x20, foam blue ball 2x20 lvl ground blue ball stand on foam 3x20    Standing hip abd- L stance    3x10 rtb                                                                                Ther Ex 8/11  8/13  8/17/20  8/20/20  8/27  9/1 9/3/20   S/l hip abd x10x5"  x10x5"      HEP      S/l hip add x10  x10x3"      HEP      Prone hip ext x10x5"  x10x5"  EOB 10x5"  EOB 10x5"  EOB 2x10x5"  EOB 3x10x5" EOB 3x10x5"   Supine SLR x10x5"  x10x5"  10x5"  10x5"  3x10x5"  3x10x7" 3x10x7"   Standing hip abd- L stance X45"- GTB  HOLD  gtb 3x10          Standing hip add- L stance X45"- GTB             Pt edu                  vigor gym    no base lvl 10  3x25, SL lvl 8 3x15   lvl 10 3x25; single leg lvl 8 3x15  lvl 10 3x25; single leg lvl 8 3x15  single leg lvl 8 3x30  lvl 10 B 5' lvl 10 B 5'   Side stepping  ytb 5x20' ea ytb 6x20' gtb 6x20' HELD     Heel tap  lvl ground 2x10 lvl 3x10 4" step 3x10 lvl 3x10 held    Leg press BL    85# 3x10      Leg press SLS    35# 3x10      LAQ     gtb 3x12x5" Blue tb 3x12x5" blk 3x15x2"   Hamstring curls      Blue tb 3x10 blk 3x12x2"   Ther Activity                                                  Gait Training                                                  Modalities

## 2020-09-08 ENCOUNTER — HOSPITAL ENCOUNTER (OUTPATIENT)
Dept: RADIOLOGY | Age: 45
Discharge: HOME | End: 2020-09-08
Payer: COMMERCIAL

## 2020-09-08 ENCOUNTER — OFFICE VISIT (OUTPATIENT)
Dept: PHYSICAL THERAPY | Age: 45
End: 2020-09-08
Payer: COMMERCIAL

## 2020-09-08 DIAGNOSIS — S89.92XD INJURY OF LEFT KNEE, SUBSEQUENT ENCOUNTER: Primary | ICD-10-CM

## 2020-09-08 DIAGNOSIS — S89.92XA INJURY OF LEFT KNEE, INITIAL ENCOUNTER: ICD-10-CM

## 2020-09-08 DIAGNOSIS — F41.1 GAD (GENERALIZED ANXIETY DISORDER): ICD-10-CM

## 2020-09-08 DIAGNOSIS — M23.92 INTERNAL DERANGEMENT OF LEFT KNEE: ICD-10-CM

## 2020-09-08 PROCEDURE — G1004 CDSM NDSC: HCPCS

## 2020-09-08 PROCEDURE — 73721 MRI JNT OF LWR EXTRE W/O DYE: CPT

## 2020-09-08 PROCEDURE — 97112 NEUROMUSCULAR REEDUCATION: CPT | Performed by: PHYSICAL THERAPIST

## 2020-09-08 PROCEDURE — 97110 THERAPEUTIC EXERCISES: CPT | Performed by: PHYSICAL THERAPIST

## 2020-09-08 RX ORDER — BUSPIRONE HYDROCHLORIDE 7.5 MG/1
7.5 TABLET ORAL 2 TIMES DAILY
Qty: 180 TABLET | Refills: 1 | Status: SHIPPED | OUTPATIENT
Start: 2020-09-08 | End: 2020-10-19

## 2020-09-08 NOTE — PROGRESS NOTES
Daily Note     Today's date: 2020  Patient name: Marci Leblanc  : 1975  MRN: [id number]  Referring provider: Sole Delgado , *  Dx:   Encounter Diagnosis     ICD-10-CM    1  Injury of left knee, subsequent encounter  S89  92XD        Start Time:   Stop Time: 1300  Total time in clinic (min): 45 minutes    Subjective:  Pt reports doing a 4 mile yesterday and his knee feels ok today, he had some soreness during the hiking but was ok  Patient has noted increased episodes of catching in the last two weeks  Patient has MRI later today  Objective: See treatment diary below    Assessment:  Continued w/ stability based exercises as well as strengthening and progressed as able, avoiding any pain  Initiated additional closed chain strengthening as patient was able to complete squats and single leg deadlifts without pain  In future visits will continue to focus on balance/stability as well as strengthening as patient is able w/o pain  Patient co-treated by Physical Therapy Student, Qi Smith, under my direct supervision   with PT/PT student for 35 minutes, remaining time independent exercise program   Plan: Continue per plan of care  Progress treatment as tolerated         Precautions: HTN, hx of Lyme Disease (left ankle and right hand pain)     Manuals                                         Neuro Re-Ed  8/17/20  8/20/20  8/27  9/1 9/3/20 9/8    mini squat on bosu      10x10" w/ perturbations  10x10" w/ perturbations 10x10" w/pertubations  10x15" w/ perturbations    SL w/ rebounder  on Airex yellow ball  3x30; M/L red ball 3x30 ea   airex, yellow ball 3x30; M/L red ball 3x30    lvl ground yellow ball 2x20, foam blue ball 2x20 lvl ground blue ball stand on foam 3x20 Foam, blue ball 4x25    Standing hip abd- L stance             Single leg dead lift          3x10 10#                                              Ther Ex  8/17/20  8/20/20  8/27  9/1 9/3/20 9/8   S/l hip abd      HEP       S/l hip add      HEP       Prone hip ext  EOB 10x5"  EOB 10x5"  EOB 2x10x5"  EOB 3x10x5" EOB 3x10x5" EOB 3x10x7" 1#   Supine SLR  10x5"  10x5"  3x10x5"  3x10x7" 3x10x7" 3x10x7" 1#   Standing hip abd- L stance  gtb 3x10           Standing hip add- L stance            Pt edu               vigor gym  lvl 10 3x25; single leg lvl 8 3x15  lvl 10 3x25; single leg lvl 8 3x15  single leg lvl 8 3x30  lvl 10 B 5' lvl 10 B 5' lvl 10 B 5'   Side stepping ytb 6x20' gtb 6x20' HELD      Heel tap lvl 3x10 4" step 3x10 lvl 3x10 held     Leg press BL  85# 3x10       Leg press SLS  35# 3x10       LAQ   gtb 3x12x5" Blue tb 3x12x5" blk 3x15x2" HEP   Hamstring curls    Blue tb 3x10 blk 3x12x2" HEP   squats      3x12 15#   Bridge w/ pball hamstring curl      3x10   Ther Activity                                         Gait Training                                         Modalities

## 2020-09-10 ENCOUNTER — OFFICE VISIT (OUTPATIENT)
Dept: OBGYN CLINIC | Facility: OTHER | Age: 45
End: 2020-09-10
Payer: COMMERCIAL

## 2020-09-10 VITALS
WEIGHT: 198 LBS | BODY MASS INDEX: 26.82 KG/M2 | HEIGHT: 72 IN | SYSTOLIC BLOOD PRESSURE: 130 MMHG | DIASTOLIC BLOOD PRESSURE: 83 MMHG | HEART RATE: 80 BPM

## 2020-09-10 DIAGNOSIS — S83.232A COMPLEX TEAR OF MEDIAL MENISCUS OF LEFT KNEE AS CURRENT INJURY, INITIAL ENCOUNTER: ICD-10-CM

## 2020-09-10 DIAGNOSIS — M23.92 INTERNAL DERANGEMENT OF LEFT KNEE: ICD-10-CM

## 2020-09-10 PROCEDURE — 3079F DIAST BP 80-89 MM HG: CPT | Performed by: ORTHOPAEDIC SURGERY

## 2020-09-10 PROCEDURE — 99214 OFFICE O/P EST MOD 30 MIN: CPT | Performed by: ORTHOPAEDIC SURGERY

## 2020-09-10 PROCEDURE — 1036F TOBACCO NON-USER: CPT | Performed by: ORTHOPAEDIC SURGERY

## 2020-09-10 NOTE — PROGRESS NOTES
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    Plan:    Reviewed with the patient that certainly his knee has a symptomatic medial meniscus tear which is failed to fully improve with physical therapy  His ACL does have evidence of a chronic injury that does not result instability at this time and therefore does not require surgical treatment  Given his persistent symptoms with medial meniscus he may benefit greatly from arthroscopic partial medial meniscectomy of his left knee and he does wish to consider scheduling this in the fall, he will continue physical therapy and if he continues to have no improvement he will proceed forward with arthroscopic partial medial meniscectomy of his left knee  A thorough discussion was performed with the patient regarding the risks and benefits of the procedure  Risks specific to this procedure were discussed include but are not limited to infection, neurovascular injury, risk of anesthesia, recurrent tear of meniscus, persistent pain, no help for pre-existing osteoarthritis symptoms, as well as the need for further surgery  After this discussion all questions were answered and informed consent was obtained in the office for arthroscopic partial meniscectomy      At the time of arthroscopy we will evaluate the ACL to develop more understanding of his partial injury but given his lack of instability exam today he is not indicated at this time for ACL reconstruction and he is aware of this                      Assessment  Diagnoses and all orders for this visit:    Complex tear of medial meniscus of left knee as current injury, initial encounter  -     Ambulatory referral to Orthopedic Surgery    Internal derangement of left knee  -     Ambulatory referral to Orthopedic Surgery        Discussion and Plan:    1  Plan for OR with Dr Kam Verdin (left knee partial medial meniscectomy) as his schedule allows  2  Activities to tolerance until surgery  3  Follow up after surgery  Subjective:   Patient ID: Angel Luis Mccallum is a 38 y o  male      Serene Spatz presents with chief complaint of left knee pain  Patient originally injured the knee 10 years ago  Started having pain again a few months ago after running  He has started physical therapy and notes improvement in motion and strength but continues to have pain in the left knee  Pain is localized to the medial aspect of the knee, occasionally on the lateral aspect  Pain does not radiate  He admits to intermittent swelling  He denies any steroid injections  The following portions of the patient's history were reviewed and updated as appropriate: allergies, current medications, past family history, past medical history, past social history, past surgical history and problem list     Review of Systems   Constitutional: Negative for chills and fever  HENT: Negative for hearing loss  Eyes: Negative for visual disturbance  Respiratory: Negative for shortness of breath  Cardiovascular: Negative for chest pain  Gastrointestinal: Negative for abdominal pain  Musculoskeletal:        As reviewed in the HPI   Skin: Negative for rash  Neurological:        As reviewed in the HPI   Psychiatric/Behavioral: Negative for agitation  Objective:  /83   Pulse 80   Ht 6' (1 829 m)   Wt 89 8 kg (198 lb)   BMI 26 85 kg/m²       Left Knee Exam     Tenderness   The patient is experiencing tenderness in the medial joint line  Range of Motion   The patient has normal left knee ROM      Tests   Candelario:  Medial - positive Lateral - negative  Lachman:  Posterior - negative  Drawer:  Anterior - negative       Patellar apprehension: negative    Other   Erythema: absent  Sensation: normal  Pulse: present  Swelling: none  Effusion: no effusion present    Comments:  Neg bounce            Physical Exam  Vitals signs and nursing note reviewed  Constitutional:       Appearance: He is well-developed  HENT:      Head: Normocephalic and atraumatic  Neck:      Musculoskeletal: Normal range of motion and neck supple  Cardiovascular:      Rate and Rhythm: Normal rate and regular rhythm  Heart sounds: Normal heart sounds  Pulmonary:      Effort: Pulmonary effort is normal  No respiratory distress  Breath sounds: Normal breath sounds  No wheezing  Abdominal:      General: There is no distension  Palpations: Abdomen is soft  Musculoskeletal:      Left knee: He exhibits no effusion  Skin:     General: Skin is warm and dry  Neurological:      Mental Status: He is alert and oriented to person, place, and time  Psychiatric:         Behavior: Behavior normal          Thought Content: Thought content normal          Judgment: Judgment normal            I have personally reviewed pertinent films in PACS and my interpretation is as follows      MRI left knee: no pivot shift impaction, partial tear of ACL, medial meniscus tear - posterior horn

## 2020-09-10 NOTE — PATIENT INSTRUCTIONS
What to Expect Before and After Knee Arthroscopy  You are being scheduled for an outpatient knee arthroscopy by Dr Josiane Charles  This surgery is done most commonly to treat meniscal tears; however the technique allows Dr Josiane Charles to treat a variety of problems inside the knee with small incisions and using a scope/camera instrument  Here is some information which may help to answer questions that you may have  Please do not hesitate to reach out to our team to answer questions not addressed here  Before Surgery  You will be contacted the evening prior to your surgery to confirm the scheduled time of the procedure and when to arrive at the hospital    Do not eat or drink anything after midnight the night before your surgery so that the anesthesia can be performed safely  You typically do not knee any type of brace following the surgery unless specifically instructed by Dr Kylah Matthews team   Brianne López will meet the anesthesiologist the morning of the surgery  The surgery is performed under a general anesthetic (going to sleep) but they will also offer you a regional block (injection in the leg to numb the leg) to help control your post-operative pain  Unfortunately the block will eventually wear off (typically 12-24 hours after the surgery) but can still be very helpful in managing the pain early on  You will also be provided with a prescription for narcotic pain medication for a short period of time (5 day supply) as some patients require this for post-surgical pain control  We typically will not provide more of that medication at the follow up as to not risk causing dependency and the acute pain should be improved by then  After Surgery  The surgery typically takes 20-30 minutes  When surgery is completed, Dr Josiane Charles will update your family and friends on your condition and progress   You will remain in the recovery room for between 30-60 minutes or until the anesthesia has worn off and your blood pressure and pulse are stable  You will then be brought to the post-op area and see your family/friends  You will be discharged home with crutches and should be able to put all the weight on the leg that you can tolerated  The crutches should be able to be discontinued in 48-72 hours but if you need them for support for a little longer that is fine  As each surgery is unique, Dr Jess Howard and his team will be clear if this is to change for your specific procedure  Ice applied to the knee for 20 minutes on and 20 minutes off is helpful to control pain and swelling for some patients and care should be given to make sure that ice is not in direct contact with the skin for fear of causing frostbite  The day after the surgery it is okay to remove the dressings and shower (it is okay if the incisions get wet, just try not to submerge them like in a bath for about 2 weeks following the surgery)  If you dont feel stable without the crutches to shower you can consider using a shower chair (plastic patio chair works well)  The incisions will have paper strips covering absorbable sutures and the strips will fall off over time  Make sure you dry everything after showering and a clean ace with a dry bandage (gauze from the pharmacy works great) can be placed after showering to prevent abrasion over the incisions but is not necessarily required  Most patients just leave the wounds open to the air and this is safe to do  It is normal to have some clear or even bloody drainage from the incisions but if you notice a foul odor or purulent drainage from your incision or your temperature goes above 101 5 degrees please contact the office to make sure an infection is not occurring  Pain Control    While pain is an individual experience and difficult to predict, a narcotic pain medication is often required to help manage the pain    A prescription for this will be provided but only a limited number of pills will be prescribed to help manage the acute phase of recovery  Outside of the acute phase (5 or so days), this medication will not be indicated  In addition to the narcotic pain medication, it is safe to use an anti-inflammatory (unless the patient has a medical condition that would not allow safe use of this mediation)  This includes the Advil, Motrin, Ibuprofen and Alleve category of medications  Simply follow the over the counter dosing on the package and use as indicated as another adjunct  Importantly since these medications are all very similar, use only one of them  Tylenol is a separate medication that can be utilized as well and can be taken at the same time as the other medication or given in a "staggered" manner  Just make sure that you follow the dosing on the over the counter bottle instructions  Also make sure that the pain medication prescribed by Dr Jamia Gaviria team does not contain acetaminophen (this is found in Percocet and Vicodin)  Typically we do not prescribe those types of pain medications but if for some reason that has been prescribed DO NOT add more Tylenol (acetaminophen) as you could end up taking too much of that medication  How to [place] in the First Week  You are encouraged to return to your normal eating and sleeping patterns as soon as possible  It is important for you to be active in order to control your weight and muscle tone so dont be afraid to move about the house as much as you can tolerate and even consider short walks using the crutches for support if needed  We will limit higher impact activities such as running for 4-6 weeks but gradually increasing your activity is safe as long as your knee does not start swelling up or causing pain  If that does happen back off of the activity  You might be able to return to work within several days however this differs from patient to patient  If your job requires heavy lifting, manual labor or climbing, there may be a delay for several weeks  Your first post-operative appointment will be with Dr Danielle Horvath physician assistant (PA) a few days after the surgery where she will review the intra-operative pictures from the surgery and make sure things are going well  Please understand that it is quite common to still experience pain at that time but the pain should be steadily improving  The majority of our routine knee arthroscopy patients do not require formal Physical Therapy and a simple gradual return to activity is adequate  If you wish to attend PT or the details of you procedure will require you to do so, the first post-op visit is a perfect time to get that scheduled and started  Hopefully this has provided you with information that will help you prepare and get thru the procedure  Please do not hesitate to reach out to our team to answer questions not addressed here  Also remember that these are general guidelines and each surgery is unique so some changes to the above information may be required

## 2020-09-15 ENCOUNTER — OFFICE VISIT (OUTPATIENT)
Dept: PHYSICAL THERAPY | Age: 45
End: 2020-09-15
Payer: COMMERCIAL

## 2020-09-15 DIAGNOSIS — S89.92XD INJURY OF LEFT KNEE, SUBSEQUENT ENCOUNTER: Primary | ICD-10-CM

## 2020-09-15 PROCEDURE — 97112 NEUROMUSCULAR REEDUCATION: CPT

## 2020-09-15 PROCEDURE — 97110 THERAPEUTIC EXERCISES: CPT

## 2020-09-15 NOTE — PROGRESS NOTES
Daily Note     Today's date: 9/15/2020  Patient name: Angel Luis Mccallum  : 1975  MRN: [id number]  Referring provider: Kermit Valentine , *  Dx:   Encounter Diagnosis     ICD-10-CM    1  Injury of left knee, subsequent encounter  S89  92XD                   Subjective:  Pt reports L knee feels about the same, has intermittent pain which hinders his activities at times      Objective: See treatment diary below      Assessment: pt performed ex within a pain free tolerance, pt completed ther ex for strengthening and stabilization L knee, occasional LOB with balance ex     Plan: Continue per plan of care  Progress treatment as tolerated         Precautions: HTN, hx of Lyme Disease (left ankle and right hand pain)     Manuals                                            Neuro Re-Ed  8/17/20  8/20/20  8/27  9/1 9/3/20 9/8 9/15/20    mini squat on bosu      10x10" w/ perturbations  10x10" w/ perturbations 10x10" w/pertubations  10x15" w/ perturbations 10x15" with pertubations    SL w/ rebounder  on Airex yellow ball  3x30; M/L red ball 3x30 ea   airex, yellow ball 3x30; M/L red ball 3x30    lvl ground yellow ball 2x20, foam blue ball 2x20 lvl ground blue ball stand on foam 3x20 Foam, blue ball 4x25 4x25 foam pink ball    Standing hip abd- L stance              Single leg dead lift          3x10 10#  10# 3x10                                                Ther Ex  8/17/20  8/20/20  8/27  9/1 9/3/20 9/8 9/15/20   S/l hip abd      HEP        S/l hip add      HEP        Prone hip ext  EOB 10x5"  EOB 10x5"  EOB 2x10x5"  EOB 3x10x5" EOB 3x10x5" EOB 3x10x7" 1# EOB 1# 3x10   Supine SLR  10x5"  10x5"  3x10x5"  3x10x7" 3x10x7" 3x10x7" 1# 1# 3x10x5"   Standing hip abd- L stance  gtb 3x10            Standing hip add- L stance             Pt edu                vigor gym  lvl 10 3x25; single leg lvl 8 3x15  lvl 10 3x25; single leg lvl 8 3x15  single leg lvl 8 3x30  lvl 10 B 5' lvl 10 B 5' lvl 10 B 5' lvl 10 B 5'   Side stepping ytb 6x20' gtb 6x20' HELD       Heel tap lvl 3x10 4" step 3x10 lvl 3x10 held      Leg press BL  85# 3x10     125# 3x10   Leg press SLS  35# 3x10        LAQ   gtb 3x12x5" Blue tb 3x12x5" blk 3x15x2" HEP    Hamstring curls    Blue tb 3x10 blk 3x12x2" HEP    squats      3x12 15# 15# 3x12   Bridge w/ pball hamstring curl      3x10 3x10   Monster walk       gtb 4x20'             Ther Activity                                            Gait Training                                            Modalities

## 2020-09-17 ENCOUNTER — OFFICE VISIT (OUTPATIENT)
Dept: PHYSICAL THERAPY | Age: 45
End: 2020-09-17
Payer: COMMERCIAL

## 2020-09-17 DIAGNOSIS — S89.92XD INJURY OF LEFT KNEE, SUBSEQUENT ENCOUNTER: Primary | ICD-10-CM

## 2020-09-17 PROCEDURE — 97110 THERAPEUTIC EXERCISES: CPT

## 2020-09-17 PROCEDURE — 97112 NEUROMUSCULAR REEDUCATION: CPT

## 2020-09-17 NOTE — PROGRESS NOTES
Daily Note     Today's date: 2020  Patient name: Rik Robison  : 1975  MRN: [id number]  Referring provider: Kieran Lowe , *  Dx:   Encounter Diagnosis     ICD-10-CM    1  Injury of left knee, subsequent encounter  S89  92XD                   Subjective: pt reports feeling about the same      Objective: See treatment diary below      Assessment:  progressin single leg ex with mod fatigue noted, improved control noted with single leg deadlift      Plan: Continue per plan of care  Progress treatment as tolerated         Precautions: HTN, hx of Lyme Disease (left ankle and right hand pain)     Manuals                                               Neuro Re-Ed  8/17/20  8/20/20  8/27  9/1 9/3/20 9/8 9/15/20 9/17/20    mini squat on bosu      10x10" w/ perturbations  10x10" w/ perturbations 10x10" w/pertubations  10x15" w/ perturbations 10x15" with pertubations np    SL w/ rebounder  on Airex yellow ball  3x30; M/L red ball 3x30 ea   airex, yellow ball 3x30; M/L red ball 3x30    lvl ground yellow ball 2x20, foam blue ball 2x20 lvl ground blue ball stand on foam 3x20 Foam, blue ball 4x25 4x25 foam pink ball 4x25 foam pink ball    Standing hip abd- L stance               Single leg dead lift          3x10 10#  10# 3x10 10# 3x10                                                   Ther Ex  8/17/20  8/20/20  8/27  9/1 9/3/20 9/8 9/15/20 9/17/20   S/l hip abd      HEP         S/l hip add      HEP         Prone hip ext  EOB 10x5"  EOB 10x5"  EOB 2x10x5"  EOB 3x10x5" EOB 3x10x5" EOB 3x10x7" 1# EOB 1# 3x10    Supine SLR  10x5"  10x5"  3x10x5"  3x10x7" 3x10x7" 3x10x7" 1# 1# 3x10x5"    Standing hip abd- L stance  gtb 3x10             Standing hip add- L stance              Pt edu                 vigor gym  lvl 10 3x25; single leg lvl 8 3x15  lvl 10 3x25; single leg lvl 8 3x15  single leg lvl 8 3x30  lvl 10 B 5' lvl 10 B 5' lvl 10 B 5' lvl 10 B 5' lvl 10 B 5'   Vigor gym R LS in Single leg        lvl 10 3x10 Side stepping ytb 6x20' gtb 6x20' HELD     gtb 2x20'   Heel tap lvl 3x10 4" step 3x10 lvl 3x10 held       Leg press BL  85# 3x10     125# 3x10 125# 3x10   Leg press SLS  35# 3x10      35# 3x10   LAQ   gtb 3x12x5" Blue tb 3x12x5" blk 3x15x2" HEP     Hamstring curls    Blue tb 3x10 blk 3x12x2" HEP     squats      3x12 15# 15# 3x12 15# 3x12 gtb @ knees   Bridge w/ pball hamstring curl      3x10 3x10 np   Monster walk       gtb 4x20' gtb 4x20'              Ther Activity                                               Gait Training                                               Modalities

## 2020-09-21 ENCOUNTER — OFFICE VISIT (OUTPATIENT)
Dept: PHYSICAL THERAPY | Age: 45
End: 2020-09-21
Payer: COMMERCIAL

## 2020-09-21 DIAGNOSIS — S89.92XD INJURY OF LEFT KNEE, SUBSEQUENT ENCOUNTER: Primary | ICD-10-CM

## 2020-09-21 PROCEDURE — 97110 THERAPEUTIC EXERCISES: CPT | Performed by: PHYSICAL THERAPIST

## 2020-09-21 PROCEDURE — 97112 NEUROMUSCULAR REEDUCATION: CPT | Performed by: PHYSICAL THERAPIST

## 2020-09-21 NOTE — PROGRESS NOTES
Daily Note     Today's date: 2020  Patient name: Avila Hamilton  : 1975  MRN: [id number]  Referring provider: Pantera Morelos , *  Dx:   Encounter Diagnosis     ICD-10-CM    1  Injury of left knee, subsequent encounter  S89  92XD        Start Time: 0430  Stop Time: 1300  Total time in clinic (min): 45 minutes    Subjective: pt reports knee was sore over the weekend with increased standing  Objective: See treatment diary below    Assessment:  Difficulty noted with proprioceptive exercises today  Also weakness noted in glute med with progression to side lying hip abduction in plank position  Plan: Continue per plan of care  Progress treatment as tolerated         Precautions: HTN, hx of Lyme Disease (left ankle and right hand pain)     Manuals                                Neuro Re-Ed  9/1 9/3/20 9/8 9/15/20 9/17/20 9/21    mini squat on bosu  10x10" w/ perturbations 10x10" w/pertubations  10x15" w/ perturbations 10x15" with pertubations np     SL w/ rebounder  lvl ground yellow ball 2x20, foam blue ball 2x20 lvl ground blue ball stand on foam 3x20 Foam, blue ball 4x25 4x25 foam pink ball 4x25 foam pink ball 4x25 foam pink ball   Single leg dead lift    3x10 10#  10# 3x10 10# 3x10 3*10, EC                                    Ther Ex  9/1 9/3/20 9/8 9/15/20 9/17/20 9/21   Prone hip ext  EOB 3x10x5" EOB 3x10x5" EOB 3x10x7" 1# EOB 1# 3x10     Pt edu            vigor gym  lvl 10 B 5' lvl 10 B 5' lvl 10 B 5' lvl 10 B 5' lvl 10 B 5' lvl 10, B/L 5'   Vigor gym R LS in Single leg     lvl 10 3x10    Side stepping     gtb 2x20' 15#, cable column 10x   Heel tap held        Leg press BL    125# 3x10 125# 3x10 3*10, 125-140#   Leg press SLS     35# 3x10    LAQ, ham curls- HEP Blue tb 3x12x5" blk 3x15x2" HEP      squats   3x12 15# 15# 3x12 15# 3x12 gtb @ knees 15# 3x12 gtb @ knees   Monster walk    gtb 4x20' gtb 4x20'    Side plank hip abd       2*8    Ther Activity                                Gait Training                                Modalities

## 2020-09-29 ENCOUNTER — OFFICE VISIT (OUTPATIENT)
Dept: PHYSICAL THERAPY | Age: 45
End: 2020-09-29
Payer: COMMERCIAL

## 2020-09-29 DIAGNOSIS — S89.92XD INJURY OF LEFT KNEE, SUBSEQUENT ENCOUNTER: Primary | ICD-10-CM

## 2020-09-29 PROCEDURE — 97112 NEUROMUSCULAR REEDUCATION: CPT

## 2020-09-29 PROCEDURE — 97110 THERAPEUTIC EXERCISES: CPT

## 2020-09-29 NOTE — PROGRESS NOTES
Daily Note     Today's date: 2020  Patient name: Tami Valdez  : 1975  MRN: [id number]  Referring provider: Karolyn Toscano , *  Dx:   Encounter Diagnosis     ICD-10-CM    1  Injury of left knee, subsequent encounter  S89  92XD                   Subjective:  Pt reports feeling about the same, reports increased pain with increased weight with CC sidestepping      Objective: See treatment diary below      Assessment: Tolerated treatment well  Patient demonstrated fatigue post treatment and would benefit from continued PT, pain persists L knee with lateral motions      Plan: Continue per plan of care  Progress treatment as tolerated         Precautions: HTN, hx of Lyme Disease (left ankle and right hand pain)     Manuals                          Neuro Re-Ed 9/8 9/15/20 9/17/20 9/21 9/29/20    mini squat on bosu 10x15" w/ perturbations 10x15" with pertubations np      SL w/ rebounder Foam, blue ball 4x25 4x25 foam pink ball 4x25 foam pink ball 4x25 foam pink ball 4x25 on blk disc pink MB   Single leg dead lift 3x10 10#  10# 3x10 10# 3x10 3*10, EC EC 3x10                              Ther Ex 9/8 9/15/20 9/17/20 9/21 9/29/20   Prone hip ext EOB 3x10x7" 1# EOB 1# 3x10      Pt edu          vigor gym lvl 10 B 5' lvl 10 B 5' lvl 10 B 5' lvl 10, B/L 5' lvl 8 5' BL   Vigor gym R LS in Single leg   lvl 10 3x10     Side stepping   gtb 2x20' 15#, cable column 10x # CC 15#25x  20# 5x (pain)   Heel tap        Leg press BL  125# 3x10 125# 3x10 3*10, 125-140# 125# 3x10   Leg press SLS   35# 3x10     LAQ, ham curls- HEP HEP       squats 3x12 15# 15# 3x12 15# 3x12 gtb @ knees 15# 3x12 gtb @ knees 15# gtb 3x12   Monster walk  gtb 4x20' gtb 4x20'     Side plank hip abd     2*8     Ther Activity                          Gait Training                          Modalities

## 2020-10-21 ENCOUNTER — ANESTHESIA EVENT (OUTPATIENT)
Dept: PERIOP | Facility: AMBULARY SURGERY CENTER | Age: 45
End: 2020-10-21
Payer: COMMERCIAL

## 2020-10-27 ENCOUNTER — TELEPHONE (OUTPATIENT)
Dept: OBGYN CLINIC | Facility: HOSPITAL | Age: 45
End: 2020-10-27

## 2020-11-04 ENCOUNTER — ANESTHESIA (OUTPATIENT)
Dept: PERIOP | Facility: AMBULARY SURGERY CENTER | Age: 45
End: 2020-11-04
Payer: COMMERCIAL

## 2020-12-03 ENCOUNTER — VBI (OUTPATIENT)
Dept: ADMINISTRATIVE | Facility: OTHER | Age: 45
End: 2020-12-03

## 2020-12-11 ENCOUNTER — HOSPITAL ENCOUNTER (OUTPATIENT)
Facility: AMBULARY SURGERY CENTER | Age: 45
Setting detail: OUTPATIENT SURGERY
Discharge: HOME | End: 2020-12-11
Attending: ORTHOPAEDIC SURGERY | Admitting: ORTHOPAEDIC SURGERY
Payer: COMMERCIAL

## 2020-12-11 VITALS — HEART RATE: 70 BPM

## 2020-12-11 VITALS
BODY MASS INDEX: 26.82 KG/M2 | DIASTOLIC BLOOD PRESSURE: 88 MMHG | WEIGHT: 198 LBS | HEIGHT: 72 IN | TEMPERATURE: 98 F | OXYGEN SATURATION: 98 % | HEART RATE: 75 BPM | RESPIRATION RATE: 18 BRPM | SYSTOLIC BLOOD PRESSURE: 140 MMHG

## 2020-12-11 DIAGNOSIS — S83.242D ACUTE MEDIAL MENISCUS TEAR OF LEFT KNEE, SUBSEQUENT ENCOUNTER: Primary | ICD-10-CM

## 2020-12-11 PROBLEM — S83.242A ACUTE MEDIAL MENISCUS TEAR OF LEFT KNEE: Status: ACTIVE | Noted: 2020-09-10

## 2020-12-11 PROCEDURE — NC001 PR NO CHARGE: Performed by: ORTHOPAEDIC SURGERY

## 2020-12-11 PROCEDURE — 29881 ARTHRS KNE SRG MNISECTMY M/L: CPT | Performed by: PHYSICIAN ASSISTANT

## 2020-12-11 PROCEDURE — 29881 ARTHRS KNE SRG MNISECTMY M/L: CPT | Performed by: ORTHOPAEDIC SURGERY

## 2020-12-11 RX ORDER — ACETAMINOPHEN 325 MG/1
650 TABLET ORAL EVERY 6 HOURS PRN
Status: DISCONTINUED | OUTPATIENT
Start: 2020-12-11 | End: 2020-12-11 | Stop reason: HOSPADM

## 2020-12-11 RX ORDER — ROPIVACAINE HYDROCHLORIDE 5 MG/ML
INJECTION, SOLUTION EPIDURAL; INFILTRATION; PERINEURAL AS NEEDED
Status: DISCONTINUED | OUTPATIENT
Start: 2020-12-11 | End: 2020-12-11

## 2020-12-11 RX ORDER — SODIUM CHLORIDE, SODIUM LACTATE, POTASSIUM CHLORIDE, CALCIUM CHLORIDE 600; 310; 30; 20 MG/100ML; MG/100ML; MG/100ML; MG/100ML
50 INJECTION, SOLUTION INTRAVENOUS CONTINUOUS
Status: DISCONTINUED | OUTPATIENT
Start: 2020-12-11 | End: 2020-12-11 | Stop reason: HOSPADM

## 2020-12-11 RX ORDER — ONDANSETRON 2 MG/ML
INJECTION INTRAMUSCULAR; INTRAVENOUS AS NEEDED
Status: DISCONTINUED | OUTPATIENT
Start: 2020-12-11 | End: 2020-12-11

## 2020-12-11 RX ORDER — SODIUM CHLORIDE, SODIUM LACTATE, POTASSIUM CHLORIDE, CALCIUM CHLORIDE 600; 310; 30; 20 MG/100ML; MG/100ML; MG/100ML; MG/100ML
INJECTION, SOLUTION INTRAVENOUS CONTINUOUS PRN
Status: DISCONTINUED | OUTPATIENT
Start: 2020-12-11 | End: 2020-12-11

## 2020-12-11 RX ORDER — DEXAMETHASONE SODIUM PHOSPHATE 10 MG/ML
INJECTION, SOLUTION INTRAMUSCULAR; INTRAVENOUS AS NEEDED
Status: DISCONTINUED | OUTPATIENT
Start: 2020-12-11 | End: 2020-12-11

## 2020-12-11 RX ORDER — FENTANYL CITRATE 50 UG/ML
INJECTION, SOLUTION INTRAMUSCULAR; INTRAVENOUS AS NEEDED
Status: DISCONTINUED | OUTPATIENT
Start: 2020-12-11 | End: 2020-12-11

## 2020-12-11 RX ORDER — PROPOFOL 10 MG/ML
INJECTION, EMULSION INTRAVENOUS AS NEEDED
Status: DISCONTINUED | OUTPATIENT
Start: 2020-12-11 | End: 2020-12-11

## 2020-12-11 RX ORDER — CEFAZOLIN SODIUM 2 G/50ML
2000 SOLUTION INTRAVENOUS ONCE
Status: COMPLETED | OUTPATIENT
Start: 2020-12-11 | End: 2020-12-11

## 2020-12-11 RX ORDER — ONDANSETRON 2 MG/ML
4 INJECTION INTRAMUSCULAR; INTRAVENOUS EVERY 6 HOURS PRN
Status: DISCONTINUED | OUTPATIENT
Start: 2020-12-11 | End: 2020-12-11 | Stop reason: HOSPADM

## 2020-12-11 RX ORDER — OXYCODONE HYDROCHLORIDE 5 MG/1
TABLET ORAL
Qty: 5 TABLET | Refills: 0 | Status: SHIPPED | OUTPATIENT
Start: 2020-12-11 | End: 2021-04-14

## 2020-12-11 RX ORDER — LIDOCAINE HYDROCHLORIDE 10 MG/ML
INJECTION, SOLUTION EPIDURAL; INFILTRATION; INTRACAUDAL; PERINEURAL AS NEEDED
Status: DISCONTINUED | OUTPATIENT
Start: 2020-12-11 | End: 2020-12-11

## 2020-12-11 RX ORDER — DEXAMETHASONE SODIUM PHOSPHATE 4 MG/ML
INJECTION, SOLUTION INTRA-ARTICULAR; INTRALESIONAL; INTRAMUSCULAR; INTRAVENOUS; SOFT TISSUE AS NEEDED
Status: DISCONTINUED | OUTPATIENT
Start: 2020-12-11 | End: 2020-12-11

## 2020-12-11 RX ORDER — FENTANYL CITRATE/PF 50 MCG/ML
50 SYRINGE (ML) INJECTION
Status: DISCONTINUED | OUTPATIENT
Start: 2020-12-11 | End: 2020-12-11 | Stop reason: HOSPADM

## 2020-12-11 RX ORDER — BUPIVACAINE HYDROCHLORIDE 2.5 MG/ML
INJECTION, SOLUTION EPIDURAL; INFILTRATION; INTRACAUDAL AS NEEDED
Status: DISCONTINUED | OUTPATIENT
Start: 2020-12-11 | End: 2020-12-11 | Stop reason: HOSPADM

## 2020-12-11 RX ORDER — ONDANSETRON 2 MG/ML
4 INJECTION INTRAMUSCULAR; INTRAVENOUS ONCE AS NEEDED
Status: DISCONTINUED | OUTPATIENT
Start: 2020-12-11 | End: 2020-12-11 | Stop reason: HOSPADM

## 2020-12-11 RX ORDER — MIDAZOLAM HYDROCHLORIDE 2 MG/2ML
INJECTION, SOLUTION INTRAMUSCULAR; INTRAVENOUS AS NEEDED
Status: DISCONTINUED | OUTPATIENT
Start: 2020-12-11 | End: 2020-12-11

## 2020-12-11 RX ORDER — KETOROLAC TROMETHAMINE 30 MG/ML
INJECTION, SOLUTION INTRAMUSCULAR; INTRAVENOUS AS NEEDED
Status: DISCONTINUED | OUTPATIENT
Start: 2020-12-11 | End: 2020-12-11

## 2020-12-11 RX ORDER — METOCLOPRAMIDE HYDROCHLORIDE 5 MG/ML
10 INJECTION INTRAMUSCULAR; INTRAVENOUS ONCE AS NEEDED
Status: DISCONTINUED | OUTPATIENT
Start: 2020-12-11 | End: 2020-12-11 | Stop reason: HOSPADM

## 2020-12-11 RX ORDER — OXYCODONE HYDROCHLORIDE 5 MG/1
5 TABLET ORAL EVERY 4 HOURS PRN
Status: DISCONTINUED | OUTPATIENT
Start: 2020-12-11 | End: 2020-12-11 | Stop reason: HOSPADM

## 2020-12-11 RX ADMIN — ONDANSETRON 4 MG: 2 INJECTION INTRAMUSCULAR; INTRAVENOUS at 07:44

## 2020-12-11 RX ADMIN — SODIUM CHLORIDE, SODIUM LACTATE, POTASSIUM CHLORIDE, AND CALCIUM CHLORIDE: .6; .31; .03; .02 INJECTION, SOLUTION INTRAVENOUS at 07:10

## 2020-12-11 RX ADMIN — ROPIVACAINE HYDROCHLORIDE 20 ML: 5 INJECTION, SOLUTION EPIDURAL; INFILTRATION; PERINEURAL at 07:10

## 2020-12-11 RX ADMIN — FENTANYL CITRATE 25 MCG: 50 INJECTION, SOLUTION INTRAMUSCULAR; INTRAVENOUS at 07:44

## 2020-12-11 RX ADMIN — FENTANYL CITRATE 50 MCG: 50 INJECTION, SOLUTION INTRAMUSCULAR; INTRAVENOUS at 07:10

## 2020-12-11 RX ADMIN — CEFAZOLIN SODIUM 2000 MG: 2 SOLUTION INTRAVENOUS at 07:34

## 2020-12-11 RX ADMIN — LIDOCAINE HYDROCHLORIDE 50 MG: 10 INJECTION, SOLUTION EPIDURAL; INFILTRATION; INTRACAUDAL at 07:38

## 2020-12-11 RX ADMIN — FENTANYL CITRATE 25 MCG: 50 INJECTION, SOLUTION INTRAMUSCULAR; INTRAVENOUS at 07:41

## 2020-12-11 RX ADMIN — KETOROLAC TROMETHAMINE 30 MG: 30 INJECTION, SOLUTION INTRAMUSCULAR at 08:09

## 2020-12-11 RX ADMIN — PROPOFOL 200 MG: 10 INJECTION, EMULSION INTRAVENOUS at 07:38

## 2020-12-11 RX ADMIN — DEXAMETHASONE SODIUM PHOSPHATE 4 MG: 4 INJECTION, SOLUTION INTRAMUSCULAR; INTRAVENOUS at 07:10

## 2020-12-11 RX ADMIN — MIDAZOLAM HYDROCHLORIDE 2 MG: 1 INJECTION, SOLUTION INTRAMUSCULAR; INTRAVENOUS at 07:10

## 2020-12-11 RX ADMIN — FENTANYL CITRATE 50 MCG: 50 INJECTION, SOLUTION INTRAMUSCULAR; INTRAVENOUS at 07:53

## 2020-12-14 ENCOUNTER — OFFICE VISIT (OUTPATIENT)
Dept: OBGYN CLINIC | Facility: OTHER | Age: 45
End: 2020-12-14

## 2020-12-14 VITALS
BODY MASS INDEX: 27.4 KG/M2 | HEART RATE: 63 BPM | WEIGHT: 202 LBS | DIASTOLIC BLOOD PRESSURE: 84 MMHG | SYSTOLIC BLOOD PRESSURE: 144 MMHG

## 2020-12-14 DIAGNOSIS — Z47.89 AFTERCARE FOLLOWING SURGERY OF THE MUSCULOSKELETAL SYSTEM: Primary | ICD-10-CM

## 2020-12-14 PROCEDURE — 99024 POSTOP FOLLOW-UP VISIT: CPT | Performed by: PHYSICIAN ASSISTANT

## 2020-12-16 ENCOUNTER — TELEMEDICINE (OUTPATIENT)
Dept: PSYCHIATRY | Facility: CLINIC | Age: 45
End: 2020-12-16
Payer: COMMERCIAL

## 2020-12-16 DIAGNOSIS — F41.1 GAD (GENERALIZED ANXIETY DISORDER): Primary | ICD-10-CM

## 2020-12-16 PROCEDURE — 1036F TOBACCO NON-USER: CPT | Performed by: NURSE PRACTITIONER

## 2020-12-16 PROCEDURE — 99213 OFFICE O/P EST LOW 20 MIN: CPT | Performed by: NURSE PRACTITIONER

## 2021-02-19 ENCOUNTER — TELEMEDICINE (OUTPATIENT)
Dept: INTERNAL MEDICINE CLINIC | Age: 46
End: 2021-02-19
Payer: COMMERCIAL

## 2021-02-19 VITALS
TEMPERATURE: 97.5 F | HEART RATE: 78 BPM | SYSTOLIC BLOOD PRESSURE: 140 MMHG | DIASTOLIC BLOOD PRESSURE: 98 MMHG | WEIGHT: 194 LBS | HEIGHT: 72 IN | BODY MASS INDEX: 26.28 KG/M2

## 2021-02-19 DIAGNOSIS — J06.9 UPPER RESPIRATORY TRACT INFECTION, UNSPECIFIED TYPE: Primary | ICD-10-CM

## 2021-02-19 PROCEDURE — 3725F SCREEN DEPRESSION PERFORMED: CPT | Performed by: NURSE PRACTITIONER

## 2021-02-19 PROCEDURE — 3008F BODY MASS INDEX DOCD: CPT | Performed by: NURSE PRACTITIONER

## 2021-02-19 PROCEDURE — 1036F TOBACCO NON-USER: CPT | Performed by: NURSE PRACTITIONER

## 2021-02-19 PROCEDURE — 99213 OFFICE O/P EST LOW 20 MIN: CPT | Performed by: NURSE PRACTITIONER

## 2021-02-19 NOTE — ASSESSMENT & PLAN NOTE
Patient tested COVID negative, and is no longer having any symptoms and no known exposure patient may return to work  Patient is to call back if symptoms return

## 2021-02-19 NOTE — PROGRESS NOTES
COVID-19 Virtual Visit     Assessment/Plan:    Problem List Items Addressed This Visit        Respiratory    URI (upper respiratory infection) - Primary      Patient tested COVID negative, and is no longer having any symptoms and no known exposure patient may return to work  Patient is to call back if symptoms return  BMI Counseling: Body mass index is 26 31 kg/m²  The BMI is above normal  Nutrition recommendations include decreasing portion sizes, encouraging healthy choices of fruits and vegetables, decreasing fast food intake, consuming healthier snacks, limiting drinks that contain sugar, moderation in carbohydrate intake, increasing intake of lean protein, reducing intake of saturated and trans fat and reducing intake of cholesterol  Exercise recommendations include moderate physical activity 150 minutes/week and exercising 3-5 times per week  Disposition:     I have spent 15 minutes directly with the patient  Encounter provider LEIA Ortiz    Provider located at [address] [place], [place] [id number]    Recent Visits  No visits were found meeting these conditions  Showing recent visits within past 7 days and meeting all other requirements     Today's Visits  Date Type Provider Dept   02/19/21 Telemedicine [facility]IA [facility]   Showing today's visits and meeting all other requirements     Future Appointments  No visits were found meeting these conditions  Showing future appointments within next 150 days and meeting all other requirements      This virtual check-in was done via [employer] and patient was informed that this is not a secure, HIPAA-compliant platform  He agrees to proceed  Patient agrees to participate in a virtual check in via telephone or video visit instead of presenting to the office to address urgent/immediate medical needs  Patient is aware this is a billable service  After connecting through [facility], the patient was identified by name and date of birth  Jaz Paulino was informed that this was a telemedicine visit and that the exam was being conducted confidentially over secure lines  My office door was closed  No one else was in the room  Jaz Paulino acknowledged consent and understanding of privacy and security of the telemedicine visit  I informed the patient that I have reviewed his record in Epic and presented the opportunity for him to ask any questions regarding the visit today  The patient agreed to participate  Subjective:   Jaz Paulino is a 54 y o  male who is concerned about COVID-19  Patient is currently asymptomatic  Patient denies fever, chills, fatigue, malaise, congestion, rhinorrhea, sore throat, anosmia, loss of taste, cough, shortness of breath, chest tightness, abdominal pain, nausea, vomiting, diarrhea, myalgias and headaches  Date of symptom onset: 2/12/2021    Exposure:   Contact with a person who is under investigation (PUI) for or who is positive for COVID-19 within the last 14 days?: No    Hospitalized recently for fever and/or lower respiratory symptoms?: No      Currently a healthcare worker that is involved in direct patient care?: No      Works in a special setting where the risk of COVID-19 transmission may be high? (this may include long-term care, correctional and FPC facilities; homeless shelters; assisted-living facilities and group homes ): No      Resident in a special setting where the risk of COVID-19 transmission may be high? (this may include long-term care, correctional and FPC facilities; homeless shelters; assisted-living facilities and group homes ): No      Patient tested COVID negative on 2/18/21, however his symptoms now resolved  He reports that he started an antihistamine and all symptoms resolved       Denies sick contacts, and denies known COVID exposure  Lab Results   Component Value Date    TICORONAVI Not Detected 02/18/2021     Past Medical History:   Diagnosis Date    Anxiety     BPH with obstruction/lower urinary tract symptoms 2015    Cervicalgia     Corporo-venous occlusive erectile dysfunction 2017    Epididymitis 2017    Erectile dysfunction 2014    Hypertension     Kidney stone     OAB (overactive bladder)     Positive depression screening 5/7/2019    Sore throat (viral) 9/16/2019    Urge incontinence 2014    Urgency of urination 2017     Past Surgical History:   Procedure Laterality Date    HERNIA REPAIR      NV KNEE SCOPE,MED/LAT MENISECTOMY Left 12/11/2020    Procedure: KNEE ARTHROSCOPIC MENISCECTOMY MEDIAL;  Surgeon: Peter Tavarez MD;  Location: [facility];  Service: Orthopedics    VASECTOMY       Current Outpatient Medications   Medication Sig Dispense Refill    ALPRAZolam (XANAX) 0 5 mg tablet Take 1 tablet (0 5 mg total) by mouth daily as needed for anxiety (for severe anxiety) 15 tablet 0    Ascorbic Acid (VITAMIN C) 1000 MG tablet Take 1,000 mg by mouth daily      B Complex Vitamins (B COMPLEX 1 PO) Take by mouth daily       Cholecalciferol (VITAMIN D) 2000 units CAPS Take 1 capsule by mouth daily      Coenzyme Q10 (COQ10) 100 MG CAPS Take 1 capsule by mouth daily      Omega-3 Fatty Acids (FISH OIL) 1000 MG CPDR Take by mouth daily       oxyCODONE (ROXICODONE) 5 mg immediate release tablet 1 tablets every 6 hours as needed for severe pain only  5 tablet 0    tadalafil (CIALIS) 20 MG tablet Take 1 tablet (20 mg total) by mouth as needed for erectile dysfunction 10 tablet prn    Zinc 100 MG TABS Take 1 tablet by mouth daily       No current facility-administered medications for this visit  No Known Allergies    Review of Systems   Constitutional: Negative for activity change, appetite change, chills, diaphoresis, fatigue and fever     HENT: Negative for congestion, ear discharge, ear pain, postnasal drip, rhinorrhea, sinus pressure, sinus pain and sore throat  Eyes: Negative for pain, discharge, itching and visual disturbance  Respiratory: Negative for cough, chest tightness, shortness of breath and wheezing  Cardiovascular: Negative for chest pain, palpitations and leg swelling  Gastrointestinal: Negative for abdominal pain, constipation, diarrhea, nausea and vomiting  Endocrine: Negative for polydipsia, polyphagia and polyuria  Genitourinary: Negative for difficulty urinating, dysuria and urgency  Musculoskeletal: Negative for arthralgias, back pain, myalgias and neck pain  Skin: Negative for rash and wound  Neurological: Negative for dizziness, weakness, numbness and headaches  Objective:    Vitals:    02/19/21 1124   BP: 140/98   BP Location: Left arm   Patient Position: Sitting   Cuff Size: Standard   Pulse: 78   Temp: 97 5 °F (36 4 °C)   TempSrc: Tympanic   Weight: 88 kg (194 lb)   Height: 6' (1 829 m)       Physical Exam  Vitals signs reviewed  Constitutional:       Appearance: Normal appearance  Pulmonary:      Effort: No respiratory distress  Neurological:      General: No focal deficit present  Mental Status: He is alert and oriented to person, place, and time  Psychiatric:         Mood and Affect: Mood normal          Behavior: Behavior normal        VIRTUAL VISIT DISCLAIMER    Jaz Paulino acknowledges that he has consented to an online visit or consultation  He understands that the online visit is based solely on information provided by him, and that, in the absence of a face-to-face physical evaluation by the physician, the diagnosis he receives is both limited and provisional in terms of accuracy and completeness  This is not intended to replace a full medical face-to-face evaluation by the physician  Jaz Paulino understands and accepts these terms

## 2021-03-01 ENCOUNTER — OFFICE VISIT (OUTPATIENT)
Dept: OBGYN CLINIC | Facility: OTHER | Age: 46
End: 2021-03-01

## 2021-03-01 VITALS
HEART RATE: 68 BPM | WEIGHT: 201.4 LBS | DIASTOLIC BLOOD PRESSURE: 82 MMHG | BODY MASS INDEX: 27.31 KG/M2 | SYSTOLIC BLOOD PRESSURE: 128 MMHG

## 2021-03-01 DIAGNOSIS — Z47.89 AFTERCARE FOLLOWING SURGERY OF THE MUSCULOSKELETAL SYSTEM: Primary | ICD-10-CM

## 2021-03-01 DIAGNOSIS — N52.02 CORPORO-VENOUS OCCLUSIVE ERECTILE DYSFUNCTION: ICD-10-CM

## 2021-03-01 PROCEDURE — 99024 POSTOP FOLLOW-UP VISIT: CPT | Performed by: PHYSICIAN ASSISTANT

## 2021-03-01 RX ORDER — TADALAFIL 20 MG/1
TABLET ORAL
Qty: 10 TABLET | Refills: 2 | Status: SHIPPED | OUTPATIENT
Start: 2021-03-01 | End: 2022-05-16 | Stop reason: SDUPTHER

## 2021-03-01 RX ORDER — TADALAFIL 20 MG/1
20 TABLET ORAL AS NEEDED
Qty: 10 TABLET | Refills: 0 | Status: CANCELLED | OUTPATIENT
Start: 2021-03-01

## 2021-03-01 NOTE — PROGRESS NOTES
Assessment:       1  Aftercare following surgery of the musculoskeletal system          Plan:        Patient is making progress postoperatively  He has been able to ski this season without incident  The discomfort he continues to have seems related to quad muscle weakness and ITB strain  I offered formal PT but he feels he can do quad strengthening and ITB stretches without supervision using exercises he did prior to surgery  All questions were addressed/answered to the patient's satisfaction  Follow-up is as needed if symptoms persist or fail to improve  Subjective:     Patient ID: Jasson Chairez is a 54 y o  male  Chief Complaint:    HPI    Patient presents for follow-up status post left knee arthroscopy and partial medial meniscectomy on 12/11/2020  Pain is controlled and only with certain motions  Patient has no residual paresthesia following anesthetic block  Social History     Occupational History    Not on file   Tobacco Use    Smoking status: Never Smoker    Smokeless tobacco: Never Used   Substance and Sexual Activity    Alcohol use: Yes     Frequency: 2-3 times a week     Drinks per session: 1 or 2     Binge frequency: Never     Comment: SOCIALLY    Drug use: Yes     Types: Marijuana     Comment: Every other day to every 3 days   Sexual activity: Yes      Review of Systems   Constitutional: Negative  Respiratory: Negative  Musculoskeletal: Positive for arthralgias and myalgias  Skin: Negative for wound  Neurological: Negative for weakness and numbness  Psychiatric/Behavioral: Negative  Objective:     Ortho ExamPhysical Exam  HENT:      Head: Atraumatic  Cardiovascular:      Pulses: Normal pulses  Pulmonary:      Effort: Pulmonary effort is normal    Musculoskeletal: Normal range of motion  General: No swelling  Comments: Mild tenderness medial joint line  No ligamentous instability noted   Skin:     General: Skin is warm and dry  Capillary Refill: Capillary refill takes less than 2 seconds  Comments: Surgical ports closed, incisions healed   Neurological:      Mental Status: He is alert and oriented to person, place, and time  Sensory: No sensory deficit     Psychiatric:         Mood and Affect: Mood normal          Behavior: Behavior normal

## 2021-03-01 NOTE — TELEPHONE ENCOUNTER
The patient has an upcoming office visit scheduled for 6/8/21 with Dr Mohit Ponce in the [facility] location but will run out of medication until then    Request for same, 10 tablets with 2 refills was queued and forwarded to the Advanced Practitioner covering the [facility] location for approval

## 2021-03-01 NOTE — PATIENT INSTRUCTIONS
1  Weight-bearing as tolerated  2  Home exercise program for quad strengthening  3    Follow-up as needed

## 2021-03-01 NOTE — TELEPHONE ENCOUNTER
Patient called in to schedule follow up appt  Patient is scheduled 6/8/21 with Dr Miesha Mallory     Patient can be reached at [phone]

## 2021-04-14 ENCOUNTER — TELEMEDICINE (OUTPATIENT)
Dept: INTERNAL MEDICINE CLINIC | Facility: CLINIC | Age: 46
End: 2021-04-14
Payer: COMMERCIAL

## 2021-04-14 VITALS — WEIGHT: 194 LBS | HEIGHT: 72 IN | TEMPERATURE: 98.1 F | BODY MASS INDEX: 26.28 KG/M2

## 2021-04-14 DIAGNOSIS — U07.1 COVID-19: Primary | ICD-10-CM

## 2021-04-14 PROCEDURE — 1036F TOBACCO NON-USER: CPT | Performed by: NURSE PRACTITIONER

## 2021-04-14 PROCEDURE — 3008F BODY MASS INDEX DOCD: CPT | Performed by: NURSE PRACTITIONER

## 2021-04-14 PROCEDURE — 99213 OFFICE O/P EST LOW 20 MIN: CPT | Performed by: NURSE PRACTITIONER

## 2021-04-14 RX ORDER — ALBUTEROL SULFATE 90 UG/1
AEROSOL, METERED RESPIRATORY (INHALATION)
COMMUNITY
Start: 2021-04-11

## 2021-04-14 RX ORDER — PREDNISONE 20 MG/1
TABLET ORAL
COMMUNITY
Start: 2021-04-11

## 2021-04-14 NOTE — PROGRESS NOTES
COVID-19 Outpatient Progress Note    Assessment/Plan:    Problem List Items Addressed This Visit        Other    COVID-19 - Primary     Discontinuing home isolation     Patients with confirmed COVID-19 should remain under home isolation precautions until the following conditions are met:   § They have had no fever for at least 24 hours (that is one full day of no fever without the use medicine that reduces fevers)  AND  § other symptoms have improved (for example, when their cough or shortness of breath have improved)  AND  § at least 10 days have passed since their symptoms first appeared  Patients with confirmed COVID-19 should also notify close contacts (including their workplace) and ask that they self-quarantine  Currently, close contact is defined as being within 6 feet for 10 minutes or more from the period 48 hours before symptom onset to the time at which the patient went into isolation  Close contacts of patients diagnosed with COVID-19 should be instructed by the patient to self-quarantine for 14 days from the last time of their last contact with the patient  Patient is to continue with vitamin-D and vitamin-C, albuterol inhaler as needed  Disposition:     I recommended continued isolation until at least 24 hours have passed since recovery defined as resolution of fever without the use of fever-reducing medications AND improvement in COVID symptoms AND 10 days have passed since onset of symptoms (or 10 days have passed since date of first positive viral diagnostic test for asymptomatic patients)  I have spent 15 minutes directly with the patient  Encounter provider LEIA Moralez    Provider located at [address] [place] [id number]    Recent Visits  No visits were found meeting these conditions     Showing recent visits within past 7 days and meeting all other requirements     Today's Visits  Date Type Provider Dept   04/14/21 Telemedicine LEIA [facility]   Showing today's visits and meeting all other requirements     Future Appointments  No visits were found meeting these conditions  Showing future appointments within next 150 days and meeting all other requirements      This virtual check-in was done via [employer] and patient was informed that this is not a secure, HIPAA-compliant platform  He agrees to proceed  Patient agrees to participate in a virtual check in via telephone or video visit instead of presenting to the office to address urgent/immediate medical needs  Patient is aware this is a billable service  After connecting through [facility], the patient was identified by name and date of birth  Nasir Oliva was informed that this was a telemedicine visit and that the exam was being conducted confidentially over secure lines  My office door was closed  No one else was in the room  Nasir Oliva acknowledged consent and understanding of privacy and security of the telemedicine visit  I informed the patient that I have reviewed his record in Epic and presented the opportunity for him to ask any questions regarding the visit today  The patient agreed to participate  Subjective:   Nasir Oliva is a 54 y o  male who has been screened for COVID-19  Symptom change since last report: improving  Patient's symptoms include anosmia, loss of taste, cough and diarrhea  Patient denies fever, chills, fatigue, malaise, congestion, rhinorrhea, sore throat, shortness of breath, chest tightness, abdominal pain, nausea, vomiting, myalgias and headaches  Lia Monahan has been staying home and has isolated themselves in his home  He is taking care to not share personal items and is cleaning all surfaces that are touched often, like counters, tabletops, and doorknobs using household cleaning sprays or wipes  He is wearing a mask when he leaves his room  Date of symptom onset: 4/5/2021  Date of positive COVID-19 PCR: 4/11/2021    Patient was treated with Prednisone and albuterol inhaler    Lab Results   Component Value Date    SARSCORONAVI Detected (A) 04/11/2021     Past Medical History:   Diagnosis Date    Anxiety     BPH with obstruction/lower urinary tract symptoms 2015    Cervicalgia     Corporo-venous occlusive erectile dysfunction 2017    Epididymitis 2017    Erectile dysfunction 2014    Hypertension     Kidney stone     OAB (overactive bladder)     Positive depression screening 5/7/2019    Sore throat (viral) 9/16/2019    Urge incontinence 2014    Urgency of urination 2017     Past Surgical History:   Procedure Laterality Date    HERNIA REPAIR      [place] KNEE SCOPE,MED/LAT MENISECTOMY Left 12/11/2020    Procedure: KNEE ARTHROSCOPIC MENISCECTOMY MEDIAL;  Surgeon: Arturo Mendez MD;  [facility]: [facility];  Service: Orthopedics    VASECTOMY       Current Outpatient Medications   Medication Sig Dispense Refill    albuterol (PROVENTIL HFA,VENTOLIN HFA) 90 mcg/act inhaler inhale 2 puffs by mouth and INTO THE LUNGS every 4 hours for 5 days      ALPRAZolam (XANAX) 0 5 mg tablet Take 1 tablet (0 5 mg total) by mouth daily as needed for anxiety (for severe anxiety) 15 tablet 0    Ascorbic Acid (VITAMIN C) 1000 MG tablet Take 1,000 mg by mouth daily      B Complex Vitamins (B COMPLEX 1 PO) Take by mouth daily       Cholecalciferol (VITAMIN D) 2000 units CAPS Take 1 capsule by mouth daily      Coenzyme Q10 (COQ10) 100 MG CAPS Take 1 capsule by mouth daily      Omega-3 Fatty Acids (FISH OIL) 1000 MG CPDR Take by mouth daily       predniSONE 20 mg tablet take 3 tablets by mouth once daily for 5 days      tadalafil (CIALIS) 20 MG tablet Take 1 tablet by mouth one (1) hour prior to intercourse on an empty stomach   10 tablet 2    Zinc 100 MG TABS Take 1 tablet by mouth daily       No current facility-administered medications for this visit  No Known Allergies    Review of Systems   Constitutional: Negative for activity change, appetite change, chills, diaphoresis, fatigue and fever  HENT: Negative for congestion, ear discharge, ear pain, postnasal drip, rhinorrhea, sinus pressure, sinus pain and sore throat  Eyes: Negative for pain, discharge, itching and visual disturbance  Respiratory: Positive for cough  Negative for chest tightness, shortness of breath and wheezing  Cardiovascular: Negative for chest pain, palpitations and leg swelling  Gastrointestinal: Positive for diarrhea  Negative for abdominal pain, constipation, nausea and vomiting  Endocrine: Negative for polydipsia, polyphagia and polyuria  Genitourinary: Negative for difficulty urinating, dysuria and urgency  Musculoskeletal: Negative for arthralgias, back pain, myalgias and neck pain  Skin: Negative for rash and wound  Neurological: Negative for dizziness, weakness, numbness and headaches  Objective:    Vitals:    04/14/21 1326   Temp: 98 1 °F (36 7 °C)   Weight: 88 kg (194 lb)   Height: 6' (1 829 m)       Physical Exam  Vitals signs reviewed  Constitutional:       Appearance: Normal appearance  Pulmonary:      Effort: No respiratory distress  Neurological:      General: No focal deficit present  Mental Status: He is alert and oriented to person, place, and time  Psychiatric:         Mood and Affect: Mood normal          Behavior: Behavior normal        VIRTUAL VISIT DISCLAIMER    Valentina Bergeron acknowledges that he has consented to an online visit or consultation  He understands that the online visit is based solely on information provided by him, and that, in the absence of a face-to-face physical evaluation by the physician, the diagnosis he receives is both limited and provisional in terms of accuracy and completeness   This is not intended to replace a full medical face-to-face evaluation by the physician  Nasir Oliva understands and accepts these terms

## 2021-04-14 NOTE — ASSESSMENT & PLAN NOTE
Discontinuing home isolation     Patients with confirmed COVID-19 should remain under home isolation precautions until the following conditions are met:   § They have had no fever for at least 24 hours (that is one full day of no fever without the use medicine that reduces fevers)  AND  § other symptoms have improved (for example, when their cough or shortness of breath have improved)  AND  § at least 10 days have passed since their symptoms first appeared  Patients with confirmed COVID-19 should also notify close contacts (including their workplace) and ask that they self-quarantine  Currently, close contact is defined as being within 6 feet for 10 minutes or more from the period 48 hours before symptom onset to the time at which the patient went into isolation  Close contacts of patients diagnosed with COVID-19 should be instructed by the patient to self-quarantine for 14 days from the last time of their last contact with the patient  Patient is to continue with vitamin-D and vitamin-C, albuterol inhaler as needed

## 2021-06-17 ENCOUNTER — TELEMEDICINE (OUTPATIENT)
Dept: PSYCHIATRY | Facility: CLINIC | Age: 46
End: 2021-06-17

## 2021-06-17 DIAGNOSIS — F41.1 GAD (GENERALIZED ANXIETY DISORDER): Primary | ICD-10-CM

## 2021-06-17 PROCEDURE — RECHECK: Performed by: NURSE PRACTITIONER

## 2021-06-17 NOTE — PSYCH
Spoke with patient over the phone  States he no longer needs follow-up  States everything is going like to be discharged from the practice as he no longer requires services

## 2021-07-21 ENCOUNTER — OFFICE VISIT (OUTPATIENT)
Dept: UROLOGY | Facility: MEDICAL CENTER | Age: 46
End: 2021-07-21
Payer: COMMERCIAL

## 2021-07-21 VITALS
WEIGHT: 194 LBS | BODY MASS INDEX: 26.28 KG/M2 | HEART RATE: 82 BPM | HEIGHT: 72 IN | DIASTOLIC BLOOD PRESSURE: 80 MMHG | SYSTOLIC BLOOD PRESSURE: 120 MMHG

## 2021-07-21 DIAGNOSIS — N40.0 BPH WITHOUT OBSTRUCTION/LOWER URINARY TRACT SYMPTOMS: Primary | ICD-10-CM

## 2021-07-21 DIAGNOSIS — R39.15 URGENCY OF URINATION: ICD-10-CM

## 2021-07-21 DIAGNOSIS — N52.02 CORPORO-VENOUS OCCLUSIVE ERECTILE DYSFUNCTION: ICD-10-CM

## 2021-07-21 PROCEDURE — 1036F TOBACCO NON-USER: CPT | Performed by: UROLOGY

## 2021-07-21 PROCEDURE — 99214 OFFICE O/P EST MOD 30 MIN: CPT | Performed by: UROLOGY

## 2021-07-21 PROCEDURE — 3008F BODY MASS INDEX DOCD: CPT | Performed by: UROLOGY

## 2021-07-21 RX ORDER — PANTOPRAZOLE SODIUM 40 MG/1
40 TABLET, DELAYED RELEASE ORAL DAILY
COMMUNITY
Start: 2021-07-08

## 2021-07-21 NOTE — PROGRESS NOTES
Assessment/Plan:  1  Urinary urgency frequency symptoms of overactive bladder-symptoms improved, patient discontinued anticholinergics-no further intervention    2  BPH-symptoms may be irreparable to BPH with the primary symptom being urgency  AUA symptom score currently 6  Uro lift brochure given to patient  If symptoms get worse will consider cystourethroscopy and Uro lift workup    3  Erectile dysfunction-responding to Cialis as prescribed  4  Left varicocele-asymptomatic-grade 2-no intervention  No problem-specific Assessment & Plan notes found for this encounter  Diagnoses and all orders for this visit:    BPH without obstruction/lower urinary tract symptoms  -     PSA Total, Diagnostic; Future  -     Comprehensive metabolic panel; Future    Urgency of urination    Corporo-venous occlusive erectile dysfunction    Other orders  -     Diclofenac Sodium (VOLTAREN) 1 %; apply to affected area if needed  -     pantoprazole (PROTONIX) 40 mg tablet; Take 40 mg by mouth daily          Subjective:      Patient ID: Rose Lopez is a 54 y o  male  HPI  48-year-old male treated for erectile dysfunction successfully with Cialis with no side effects and a good response with history of urinary urgency previously treated on anti cholinergics  He presents with an AUA symptom score 6 nocturia once nightly 3/5 urinary frequency in 1/5 intermittency  No history of prostate cancer known in the family although the patient is not sure  Patient presents for follow-up  The following portions of the patient's history were reviewed and updated as appropriate: allergies, current medications, past family history, past medical history, past social history, past surgical history and problem list     Review of Systems   Constitutional: Positive for fatigue  Respiratory: Positive for shortness of breath and wheezing  Genitourinary: Positive for urgency  All other systems reviewed and are negative  Objective:      /80   Pulse 82   Ht 6' (1 829 m)   Wt 88 kg (194 lb)   BMI 26 31 kg/m²          Physical Exam  Vitals reviewed  Constitutional:       General: He is not in acute distress  Appearance: Normal appearance  He is not ill-appearing, toxic-appearing or diaphoretic  HENT:      Head: Normocephalic and atraumatic  Nose: Nose normal       Mouth/Throat:      Mouth: Mucous membranes are moist    Eyes:      Extraocular Movements: Extraocular movements intact  Pulmonary:      Effort: Pulmonary effort is normal  No respiratory distress  Abdominal:      Palpations: Abdomen is soft  Genitourinary:     Comments: GALINA normal anal verge normal anal sphincter tone no palpable rectal masses  Prostate 35 g a nodular nontender  Scrotum and phallus normal circumcised meatus patent normally placed no cutaneous lesions  Testes adnexa palpably normal   Neurological:      General: No focal deficit present  Mental Status: He is alert and oriented to person, place, and time  Psychiatric:         Mood and Affect: Mood normal          Behavior: Behavior normal          Thought Content:  Thought content normal          Judgment: Judgment normal

## 2021-08-11 DIAGNOSIS — F41.1 GAD (GENERALIZED ANXIETY DISORDER): ICD-10-CM

## 2021-08-11 RX ORDER — ALPRAZOLAM 0.5 MG/1
0.5 TABLET ORAL DAILY PRN
Qty: 15 TABLET | Refills: 0 | Status: SHIPPED | OUTPATIENT
Start: 2021-08-11

## 2021-08-11 NOTE — TELEPHONE ENCOUNTER
10/14/2020 - No Show for physical  2/19/2021 - Virtual sick visit  4/14/2021 - Virtual sick visit  9/15/2021 - Scheduled physical    Last time anxiety assessed was 12/2020  Controlled substance agreement needed  Appt notes flagged

## 2022-05-16 DIAGNOSIS — N52.02 CORPORO-VENOUS OCCLUSIVE ERECTILE DYSFUNCTION: ICD-10-CM

## 2022-05-17 RX ORDER — TADALAFIL 20 MG/1
TABLET ORAL
Qty: 10 TABLET | Refills: 0 | Status: SHIPPED | OUTPATIENT
Start: 2022-05-17

## 2022-10-21 ENCOUNTER — TELEPHONE (OUTPATIENT)
Dept: UROLOGY | Facility: MEDICAL CENTER | Age: 47
End: 2022-10-21

## 2022-10-21 DIAGNOSIS — N40.0 BPH WITHOUT OBSTRUCTION/LOWER URINARY TRACT SYMPTOMS: Primary | ICD-10-CM

## 2022-10-21 NOTE — TELEPHONE ENCOUNTER
----- Message from Jeff Mohr RN sent at 10/21/2022  2:43 PM EDT -----  Regarding: FW: Yearly Visit  Please help patient schedule yearly appointment once lab work completed  This can be with [facility]  Thanks! !!!   ----- Message -----  From: Dayana Miller  Sent: 10/21/2022   1:35 PM EDT  To: [facility]  Subject: Yearly Visit                                     Hello, last time I saw Dr Sara Grimes had a script for some bloodwork  I cannot locate that script  I moved very close to the office and can stop by for it if that works or if you have a way to send it to me via Epic My Chart? After I get that done, I can schedule my yearly checkup with him      Thanks  Staci Delaney

## 2022-11-03 ENCOUNTER — RA CDI HCC (OUTPATIENT)
Dept: OTHER | Facility: HOSPITAL | Age: 47
End: 2022-11-03

## 2022-11-03 NOTE — PROGRESS NOTES
[facility]  coding opportunities       Chart reviewed, no opportunity found: CHART REVIEWED, NO OPPORTUNITY FOUND        Patients Insurance        Commercial Insurance: [address] [place] [address]

## 2022-11-11 ENCOUNTER — OFFICE VISIT (OUTPATIENT)
Dept: INTERNAL MEDICINE CLINIC | Facility: CLINIC | Age: 47
End: 2022-11-11

## 2022-11-11 VITALS
TEMPERATURE: 98.6 F | BODY MASS INDEX: 28.93 KG/M2 | HEIGHT: 72 IN | WEIGHT: 213.6 LBS | SYSTOLIC BLOOD PRESSURE: 150 MMHG | OXYGEN SATURATION: 97 % | HEART RATE: 87 BPM | DIASTOLIC BLOOD PRESSURE: 96 MMHG

## 2022-11-11 DIAGNOSIS — Z23 NEED FOR TDAP VACCINATION: ICD-10-CM

## 2022-11-11 DIAGNOSIS — Z12.11 ENCOUNTER FOR COLORECTAL CANCER SCREENING: ICD-10-CM

## 2022-11-11 DIAGNOSIS — Z11.59 ENCOUNTER FOR HEPATITIS C SCREENING TEST FOR LOW RISK PATIENT: ICD-10-CM

## 2022-11-11 DIAGNOSIS — E78.2 MIXED HYPERLIPIDEMIA: ICD-10-CM

## 2022-11-11 DIAGNOSIS — Z11.4 SCREENING FOR HIV (HUMAN IMMUNODEFICIENCY VIRUS): ICD-10-CM

## 2022-11-11 DIAGNOSIS — I10 ESSENTIAL HYPERTENSION: ICD-10-CM

## 2022-11-11 DIAGNOSIS — Z12.12 ENCOUNTER FOR COLORECTAL CANCER SCREENING: ICD-10-CM

## 2022-11-11 RX ORDER — LISINOPRIL AND HYDROCHLOROTHIAZIDE 12.5; 1 MG/1; MG/1
1 TABLET ORAL DAILY
Qty: 90 TABLET | Refills: 0 | Status: SHIPPED | OUTPATIENT
Start: 2022-11-11

## 2022-11-11 NOTE — ASSESSMENT & PLAN NOTE
Will start lisinopril-hydrochlorothiazide  Follow up in one month of recheck  Continue current regimen -   Continue to monitor blood pressure at home  Goal BP is < 130/80  Contact our office for consistent elevations  Recommend low sodium diet  Exercise 30 minutes 5 times a week as tolerated    Recommend yearly eye exam

## 2022-11-11 NOTE — PROGRESS NOTES
[facility] Adult Male Physical Visit  Patient ID: Ezequiel Soliz    : 1975  Age/Gender: 49 y o  male     DATE: 2022      Assessment/Plan:    No problem-specific Assessment & Plan notes found for this encounter  BMI Counseling: Body mass index is 28 9 kg/m²  The BMI is above normal  Nutrition recommendations include decreasing portion sizes, encouraging healthy choices of fruits and vegetables, decreasing fast food intake, consuming healthier snacks, limiting drinks that contain sugar, moderation in carbohydrate intake, increasing intake of lean protein, reducing intake of saturated and trans fat and reducing intake of cholesterol  Exercise recommendations include moderate physical activity 150 minutes/week and exercising 3-5 times per week  Rationale for BMI follow-up plan is due to patient being overweight or obese  Depression Screening and Follow-up Plan: Patient was screened for depression during today's encounter   They screened negative with a PHQ-2 score of 0        {Assess/PlanSmartLinks:93641}      Subjective:     Ezequiel Soliz is a 49 y o  male who presents to the office on 2022 for a health maintenance physical     HPI  {Common ambulatory SmartLinks:49598}    Pt reports overall health:  ***  Last Physical: ***    Healthy Diet:  ***  Routine Exercise:  ***  Weight Concerns:  ***    Problems with vision:  ***  Last Eye Exam:  ***    Problems with Hearing:  ***    Routine Dental Exams:  ***    Smoking History:  ***  ETOH Use:  ***  Illegal Drug Use:  ***  Caffeine Use:  ***    Reproductive Health:    Sexually Active:  ***  Contraceptive:  ***  Last PSA:  ***  Testicular self exam:  ***    Depression Screening:  PHQ-2/9 Depression Screening    Little interest or pleasure in doing things: 0 - not at all  Feeling down, depressed, or hopeless: 0 - not at all  PHQ-2 Score: 0  PHQ-2 Interpretation: Negative depression screen         Last Colonoscopy:  ***  History of abnormal Colonoscopy:  ***  Family History of Colon CA:  ***    Last Dexa:  ***    Last Labs:  ***    Review of Systems      Patient Active Problem List   Diagnosis   • Male erectile dysfunction   • Mixed hyperlipidemia   • Other chronic nonalcoholic liver disease   • CONNER (generalized anxiety disorder)   • Seasonal allergies   • Injury of left knee   • History of Lyme disease   • Acute medial meniscus tear of left knee   • URI (upper respiratory infection)   • COVID-19       Past Medical History:   Diagnosis Date   • Anxiety    • BPH with obstruction/lower urinary tract symptoms 2015   • Cervicalgia    • Corporo-venous occlusive erectile dysfunction 2017   • Epididymitis 2017   • Erectile dysfunction 2014   • Hypertension    • Kidney stone    • OAB (overactive bladder)    • Positive depression screening 5/7/2019   • Sore throat (viral) 9/16/2019   • Urge incontinence 2014   • Urgency of urination 2017       Past Surgical History:   Procedure Laterality Date   • HERNIA REPAIR     • [place] KNEE SCOPE,MED/LAT MENISECTOMY Left 12/11/2020    Procedure: KNEE ARTHROSCOPIC MENISCECTOMY MEDIAL;  Surgeon: Bert Candelaria MD;  Location: [facility];  Service: Orthopedics   • VASECTOMY           Current Outpatient Medications:   •  ALPRAZolam (XANAX) 0 5 mg tablet, Take 1 tablet (0 5 mg total) by mouth daily as needed for anxiety (for severe anxiety), Disp: 15 tablet, Rfl: 0  •  Ascorbic Acid (VITAMIN C) 1000 MG tablet, Take 1,000 mg by mouth daily, Disp: , Rfl:   •  B Complex Vitamins (B COMPLEX 1 PO), Take by mouth daily , Disp: , Rfl:   •  Cholecalciferol (VITAMIN D) 2000 units CAPS, Take 1 capsule by mouth daily, Disp: , Rfl:   •  Omega-3 Fatty Acids (FISH OIL) 1000 MG CPDR, Take by mouth daily , Disp: , Rfl:   •  tadalafil (CIALIS) 20 MG tablet, Take 1 tablet by mouth one (1) hour prior to intercourse on an empty stomach , Disp: 10 tablet, Rfl: 0  •  Zinc 100 MG TABS, Take 1 tablet by mouth daily, Disp: , Rfl:     No Known Allergies    Social History     Socioeconomic History   • Marital status: /Civil Union     Spouse name: None   • Number of children: None   • Years of education: None   • Highest education level: None   Occupational History   • None   Tobacco Use   • Smoking status: Never Smoker   • Smokeless tobacco: Never Used   Vaping Use   • Vaping Use: Never used   Substance and Sexual Activity   • Alcohol use: Yes     Comment: SOCIALLY   • Drug use: Yes     Types: Marijuana     Comment: Every other day to every 3 days      • Sexual activity: Yes   Other Topics Concern   • None   Social History Narrative   • None     Social Determinants of Health     Financial Resource Strain: Not on file   Food Insecurity: Not on file   Transportation Needs: Not on file   Physical Activity: Not on file   Stress: Not on file   Social Connections: Not on file   Intimate Partner Violence: Not on file   Housing Stability: Not on file       Family History   Problem Relation Age of Onset   • Lung cancer Mother    • Lung cancer Father    • Cancer Maternal Grandmother         unknown   • Heart disease Maternal Grandfather    • Prostate cancer Maternal Grandfather        Immunization History   Administered Date(s) Administered   • INFLUENZA 09/29/2009, 10/11/2013   • Tdap 05/15/2012        Health Maintenance   Topic Date Due   • Hepatitis C Screening  Never done   • COVID-19 Vaccine (1) Never done   • Hepatitis A Vaccine (1 of 2 - Risk 2-dose series) Never done   • Pneumococcal Vaccine: Pediatrics (0 to 5 Years) and At-Risk Patients (6 to 59 Years) (1 - PCV) Never done   • HIV Screening  Never done   • BMI: Adult  Never done   • Annual Physical  Never done   • Hepatitis B Vaccine (1 of 3 - Risk 3-dose series) Never done   • Colorectal Cancer Screening  Never done   • BMI: Followup Plan  02/19/2022   • DTaP,Tdap,and Td Vaccines (2 - Td or Tdap) 05/15/2022   • Influenza Vaccine (1) 09/01/2022   • Depression Screening  11/11/2023   • HIB Vaccine  Aged Out   • IPV Vaccine  Aged Out   • Meningococcal ACWY Vaccine  Aged Out   • HPV Vaccine  Aged Out       Objective:  Vitals:    11/11/22 1348   BP: 150/96   BP Location: Left arm   Patient Position: Sitting   Cuff Size: Standard   Pulse: 87   Temp: 98 6 °F (37 °C)   TempSrc: Temporal   SpO2: 97%   Weight: 96 9 kg (213 lb 9 6 oz)   Height: 6' 0 09" (1 831 m)     Wt Readings from Last 3 Encounters:   11/11/22 96 9 kg (213 lb 9 6 oz)   07/21/21 88 kg (194 lb)   04/14/21 88 kg (194 lb)     Body mass index is 28 9 kg/m²    No exam data present       Physical Exam        Future Appointments   Date Time Provider Department Center   12/19/2022 10:00 AM Rufina Camarena MD [facility] [place]  [address] [facility] [address], [address] [facility]    Patient Care Team:  LEIA Tellez as PCP - General (Family Medicine)  LEIA Tellez as PCP - PCP-[facility] Attributed-Roster  Rufina Camarena MD

## 2022-11-11 NOTE — ASSESSMENT & PLAN NOTE
Recommend healthy lifestyle choices for your cholesterol  Low fat/low cholesterol diet  Limit/avoid red meat  Eat more lean meat - chicken breast, ground turkey, fish  Exercise 30 mins at least 5 times a week as tolerated

## 2022-11-30 ENCOUNTER — TELEPHONE (OUTPATIENT)
Dept: UROLOGY | Facility: MEDICAL CENTER | Age: 47
End: 2022-11-30

## 2022-12-12 ENCOUNTER — APPOINTMENT (OUTPATIENT)
Dept: LAB | Facility: CLINIC | Age: 47
End: 2022-12-12

## 2022-12-12 DIAGNOSIS — Z11.59 ENCOUNTER FOR HEPATITIS C SCREENING TEST FOR LOW RISK PATIENT: ICD-10-CM

## 2022-12-12 DIAGNOSIS — I10 ESSENTIAL HYPERTENSION: ICD-10-CM

## 2022-12-12 DIAGNOSIS — Z11.4 SCREENING FOR HIV (HUMAN IMMUNODEFICIENCY VIRUS): ICD-10-CM

## 2022-12-12 DIAGNOSIS — N40.0 BPH WITHOUT OBSTRUCTION/LOWER URINARY TRACT SYMPTOMS: ICD-10-CM

## 2022-12-12 LAB
ALBUMIN SERPL BCP-MCNC: 4.3 G/DL (ref 3.5–5)
ALP SERPL-CCNC: 65 U/L (ref 46–116)
ALT SERPL W P-5'-P-CCNC: 75 U/L (ref 12–78)
ANION GAP SERPL CALCULATED.3IONS-SCNC: 4 MMOL/L (ref 4–13)
AST SERPL W P-5'-P-CCNC: 28 U/L (ref 5–45)
BASOPHILS # BLD AUTO: 0.03 THOUSANDS/ÂΜL (ref 0–0.1)
BASOPHILS NFR BLD AUTO: 1 % (ref 0–1)
BILIRUB SERPL-MCNC: 0.58 MG/DL (ref 0.2–1)
BUN SERPL-MCNC: 20 MG/DL (ref 5–25)
CALCIUM SERPL-MCNC: 9.8 MG/DL (ref 8.3–10.1)
CHLORIDE SERPL-SCNC: 107 MMOL/L (ref 96–108)
CHOLEST SERPL-MCNC: 228 MG/DL
CO2 SERPL-SCNC: 26 MMOL/L (ref 21–32)
CREAT SERPL-MCNC: 1.01 MG/DL (ref 0.6–1.3)
EOSINOPHIL # BLD AUTO: 0.02 THOUSAND/ÂΜL (ref 0–0.61)
EOSINOPHIL NFR BLD AUTO: 0 % (ref 0–6)
ERYTHROCYTE [DISTWIDTH] IN BLOOD BY AUTOMATED COUNT: 12.1 % (ref 11.6–15.1)
GFR SERPL CREATININE-BSD FRML MDRD: 87 ML/MIN/1.73SQ M
GLUCOSE P FAST SERPL-MCNC: 101 MG/DL (ref 65–99)
HCT VFR BLD AUTO: 52 % (ref 36.5–49.3)
HCV AB SER QL: NORMAL
HDLC SERPL-MCNC: 60 MG/DL
HGB BLD-MCNC: 17.4 G/DL (ref 12–17)
IMM GRANULOCYTES # BLD AUTO: 0.03 THOUSAND/UL (ref 0–0.2)
IMM GRANULOCYTES NFR BLD AUTO: 1 % (ref 0–2)
LDLC SERPL CALC-MCNC: 146 MG/DL (ref 0–100)
LYMPHOCYTES # BLD AUTO: 2.41 THOUSANDS/ÂΜL (ref 0.6–4.47)
LYMPHOCYTES NFR BLD AUTO: 37 % (ref 14–44)
MCH RBC QN AUTO: 30.7 PG (ref 26.8–34.3)
MCHC RBC AUTO-ENTMCNC: 33.5 G/DL (ref 31.4–37.4)
MCV RBC AUTO: 92 FL (ref 82–98)
MONOCYTES # BLD AUTO: 0.74 THOUSAND/ÂΜL (ref 0.17–1.22)
MONOCYTES NFR BLD AUTO: 11 % (ref 4–12)
NEUTROPHILS # BLD AUTO: 3.37 THOUSANDS/ÂΜL (ref 1.85–7.62)
NEUTS SEG NFR BLD AUTO: 50 % (ref 43–75)
NONHDLC SERPL-MCNC: 168 MG/DL
NRBC BLD AUTO-RTO: 0 /100 WBCS
PLATELET # BLD AUTO: 256 THOUSANDS/UL (ref 149–390)
PMV BLD AUTO: 10.5 FL (ref 8.9–12.7)
POTASSIUM SERPL-SCNC: 4 MMOL/L (ref 3.5–5.3)
PROT SERPL-MCNC: 8 G/DL (ref 6.4–8.4)
PSA SERPL-MCNC: 3.7 NG/ML (ref 0–4)
RBC # BLD AUTO: 5.66 MILLION/UL (ref 3.88–5.62)
SODIUM SERPL-SCNC: 137 MMOL/L (ref 135–147)
TRIGL SERPL-MCNC: 110 MG/DL
WBC # BLD AUTO: 6.6 THOUSAND/UL (ref 4.31–10.16)

## 2022-12-13 LAB — HIV 1+2 AB+HIV1 P24 AG SERPL QL IA: NORMAL

## 2022-12-19 ENCOUNTER — OFFICE VISIT (OUTPATIENT)
Dept: UROLOGY | Facility: MEDICAL CENTER | Age: 47
End: 2022-12-19

## 2022-12-19 VITALS
DIASTOLIC BLOOD PRESSURE: 80 MMHG | BODY MASS INDEX: 28.44 KG/M2 | SYSTOLIC BLOOD PRESSURE: 120 MMHG | HEIGHT: 72 IN | HEART RATE: 69 BPM | WEIGHT: 210 LBS

## 2022-12-19 DIAGNOSIS — N13.8 BPH WITH OBSTRUCTION/LOWER URINARY TRACT SYMPTOMS: ICD-10-CM

## 2022-12-19 DIAGNOSIS — N32.81 OVERACTIVE BLADDER: ICD-10-CM

## 2022-12-19 DIAGNOSIS — R97.20 ELEVATED PSA: Primary | ICD-10-CM

## 2022-12-19 DIAGNOSIS — N40.1 BPH WITH OBSTRUCTION/LOWER URINARY TRACT SYMPTOMS: ICD-10-CM

## 2022-12-19 DIAGNOSIS — N52.9 ERECTILE DYSFUNCTION, UNSPECIFIED ERECTILE DYSFUNCTION TYPE: ICD-10-CM

## 2022-12-19 DIAGNOSIS — N52.02 CORPORO-VENOUS OCCLUSIVE ERECTILE DYSFUNCTION: ICD-10-CM

## 2022-12-19 RX ORDER — TADALAFIL 20 MG/1
TABLET ORAL
Qty: 10 TABLET | Refills: 5 | Status: SHIPPED | OUTPATIENT
Start: 2022-12-19

## 2022-12-19 NOTE — PROGRESS NOTES
Assessment/Plan:  #1  Elevated PSA for age-PSA is 3 7 and this 43-year-old male  Previous PSA in 2016 was 1 6  Plan multi parametric MRI    2  Erectile dysfunction-responsive to [address] provided  3   Overactive bladder- symptoms are stable and not severely problematic  Nocturia once nightly-no further intervention at this time    4  BPH with obstructive symptoms-see AUA symptom score which is stable  No problem-specific Assessment & Plan notes found for this encounter  Diagnoses and all orders for this visit:    Elevated PSA  -     MRI prostate multiparametric wo w contrast; Future    Erectile dysfunction, unspecified erectile dysfunction type    Overactive bladder    BPH with obstruction/lower urinary tract symptoms          Subjective:      Patient ID: Ousmane Tijerina is a 49 y o  male  HPI  -year-old male treated for mild ED with Cialis 20 mg on an as-needed basis presents for discussion of elevated PSA for age and mild stable overactive bladder symptoms  The following portions of the patient's history were reviewed and updated as appropriate: allergies, current medications, past family history, past medical history, past social history, past surgical history and problem list     Review of Systems   Genitourinary: Positive for frequency  Musculoskeletal: Positive for myalgias  All other systems reviewed and are negative  Objective:      /80   Pulse 69   Ht 6' (1 829 m)   Wt 95 3 kg (210 lb)   BMI 28 48 kg/m²          Physical Exam  Vitals reviewed  Constitutional:       General: He is not in acute distress  Appearance: Normal appearance  He is normal weight  He is not ill-appearing, toxic-appearing or diaphoretic  HENT:      Head: Normocephalic and atraumatic  Nose: Nose normal       Mouth/Throat:      Mouth: Mucous membranes are dry  Eyes:      Extraocular Movements: Extraocular movements intact     Pulmonary:      Effort: Pulmonary effort is normal  No respiratory distress  Abdominal:      General: Bowel sounds are normal  There is no distension  Palpations: Abdomen is soft  Tenderness: There is no abdominal tenderness  There is no guarding or rebound  Genitourinary:     Penis: Normal        Testes: Normal       Rectum: Normal    Musculoskeletal:      Cervical back: Neck supple  Neurological:      Mental Status: He is alert and oriented to person, place, and time  Psychiatric:         Mood and Affect: Mood normal          Behavior: Behavior normal          Thought Content:  Thought content normal

## 2023-01-06 ENCOUNTER — TELEPHONE (OUTPATIENT)
Dept: UROLOGY | Facility: AMBULATORY SURGERY CENTER | Age: 48
End: 2023-01-06

## 2023-01-06 NOTE — TELEPHONE ENCOUNTER
p2p completed  Initial workup elevated psa  No prior bx    Mri approved [id number]; exp 7/8/23    3D rendering remains denied pending MRI image results of pirads 3 or higher

## 2023-01-06 NOTE — TELEPHONE ENCOUNTER
P2P scheduled for Monday at 12:15 w/ Dr Graciela Martin  PF#[id number] for a Multiparametric MRI  Thank you!

## 2023-01-10 PROBLEM — R73.01 ELEVATED FASTING GLUCOSE: Status: ACTIVE | Noted: 2023-01-10

## 2023-01-10 PROBLEM — J06.9 URI (UPPER RESPIRATORY INFECTION): Status: RESOLVED | Noted: 2021-02-19 | Resolved: 2023-01-10

## 2023-01-10 NOTE — PROGRESS NOTES
Assessment/Plan:    Essential hypertension  Controlled on Lisinopril- HCTZ  Mixed hyperlipidemia  Start red yeast rice, continue fish oil  Recommend healthy lifestyle choices for your cholesterol  Low fat/low cholesterol diet  Limit/avoid red meat  Eat more lean meat - chicken breast, ground turkey, fish  Exercise 30 mins at least 5 times a week as tolerated  Elevated fasting glucose  Patient is to continue to work on diet and exercise  Limit sugars and carbohydrate intake  Avoid soda, juice, sweets, cookies, desserts, pasta, bread    Eat more whole grains, exercised 30 min of cardio at least 3 times a week  Palpitations  Will get TSH and repeat CBC  Will get 24 hour Holtor monitor  Diagnoses and all orders for this visit:    Encounter for screening for malignant neoplasm of colon  -     influenza vaccine, quadrivalent, 0 5 mL, preservative-free, for adult and pediatric patients 6 mos+ (AFLURIA, FLUARIX, FLULAVAL, FLUZONE)  -     Ambulatory referral for colonoscopy; Future    Essential hypertension  -     lisinopril-hydrochlorothiazide (PRINZIDE,ZESTORETIC) 10-12 5 MG per tablet; Take 1 tablet by mouth daily  -     CBC and differential  -     Comprehensive metabolic panel; Future    Mixed hyperlipidemia  -     Lipid panel    Palpitations  -     Holter monitor; Future  -     TSH, 3rd generation with Free T4 reflex  -     CBC and differential; Future    Elevated fasting glucose          Subjective:      Patient ID: Aziza Urrutia is a 49 y o  male  Patient presents today for review of blood work and follow up on HTN  He reports that he has lost some weight with diet and exercise  Denies any new concerns       HTN- was started on lisinopril-HCTZ at last office visit, he denies side effects with this medication, denies chest pain and shortness of breath    HLD- , Cholesterol 228, ASCVD 2 9%            The following portions of the patient's history were reviewed and updated as appropriate: allergies, current medications, past family history, past medical history, past social history, past surgical history and problem list     Review of Systems   Constitutional: Negative for activity change, appetite change, chills, diaphoresis and fever  HENT: Negative for congestion, ear discharge, ear pain, postnasal drip, rhinorrhea, sinus pressure, sinus pain and sore throat  Eyes: Negative for pain, discharge, itching and visual disturbance  Respiratory: Negative for cough, chest tightness, shortness of breath and wheezing  Cardiovascular: Positive for palpitations (happen at rest, he reports that he gets tachycardic according to his apple watch )  Negative for chest pain and leg swelling  Gastrointestinal: Negative for abdominal pain, constipation, diarrhea, nausea and vomiting  Endocrine: Negative for polydipsia, polyphagia and polyuria  Genitourinary: Negative for difficulty urinating, dysuria and urgency  Musculoskeletal: Negative for arthralgias, back pain and neck pain  Skin: Negative for rash and wound  Neurological: Negative for dizziness, weakness, numbness and headaches           Past Medical History:   Diagnosis Date   • Anxiety    • BPH with obstruction/lower urinary tract symptoms 2015   • Cervicalgia    • Corporo-venous occlusive erectile dysfunction 2017   • Epididymitis 2017   • Erectile dysfunction 2014   • Hypertension    • Kidney stone    • OAB (overactive bladder)    • Positive depression screening 5/7/2019   • Sore throat (viral) 9/16/2019   • Urge incontinence 2014   • Urgency of urination 2017         Current Outpatient Medications:   •  ALPRAZolam (XANAX) 0 5 mg tablet, Take 1 tablet (0 5 mg total) by mouth daily as needed for anxiety (for severe anxiety), Disp: 15 tablet, Rfl: 0  •  Ascorbic Acid (VITAMIN C) 1000 MG tablet, Take 1,000 mg by mouth daily, Disp: , Rfl:   •  B Complex Vitamins (B COMPLEX 1 PO), Take by mouth daily , Disp: , Rfl:   •  Cholecalciferol (VITAMIN D) 2000 units CAPS, Take 1 capsule by mouth daily, Disp: , Rfl:   •  lisinopril-hydrochlorothiazide (PRINZIDE,ZESTORETIC) 10-12 5 MG per tablet, Take 1 tablet by mouth daily, Disp: 90 tablet, Rfl: 1  •  Omega-3 Fatty Acids (FISH OIL) 1000 MG CPDR, Take by mouth daily , Disp: , Rfl:   •  tadalafil (CIALIS) 20 MG tablet, Take 1 tablet by mouth one (1) hour prior to intercourse on an empty stomach , Disp: 10 tablet, Rfl: 5  •  Zinc 100 MG TABS, Take 1 tablet by mouth daily, Disp: , Rfl:     No Known Allergies    Social History   Past Surgical History:   Procedure Laterality Date   • HERNIA REPAIR     • RI ARTHRS KNE SURG W/MENISCECTOMY MED/LAT W/SHVG Left 12/11/2020    Procedure: KNEE ARTHROSCOPIC MENISCECTOMY MEDIAL;  Surgeon: Jose Luis Villagran MD;  Location: [facility];  Service: Orthopedics   • VASECTOMY       Family History   Problem Relation Age of Onset   • Lung cancer Father    • Cancer Father    • Lung cancer Mother    • Cancer Mother    • Cancer Maternal Grandmother         unknown   • Heart disease Maternal Grandfather    • Prostate cancer Maternal Grandfather    • Cancer Maternal Grandfather        Objective:  /88 (BP Location: Left arm, Patient Position: Sitting, Cuff Size: Standard)   Pulse 88   Temp 98 4 °F (36 9 °C) (Temporal)   Ht 6' (1 829 m)   Wt 92 3 kg (203 lb 6 4 oz)   SpO2 96% Comment: ra  BMI 27 59 kg/m²     Recent Results (from the past 1344 hour(s))   CBC and differential    Collection Time: 12/12/22  1:48 PM   Result Value Ref Range    WBC 6 60 4 31 - 10 16 Thousand/uL    RBC 5 66 (H) 3 88 - 5 62 Million/uL    Hemoglobin 17 4 (H) 12 0 - 17 0 g/dL    Hematocrit 52 0 (H) 36 5 - 49 3 %    MCV 92 82 - 98 fL    MCH 30 7 26 8 - 34 3 pg    MCHC 33 5 31 4 - 37 4 g/dL    RDW 12 1 11 6 - 15 1 %    MPV 10 5 8 9 - 12 7 fL    Platelets 417 327 - 061 Thousands/uL    nRBC 0 /100 WBCs    Neutrophils Relative 50 43 - 75 %    Immat GRANS % 1 0 - 2 % Lymphocytes Relative 37 14 - 44 %    Monocytes Relative 11 4 - 12 %    Eosinophils Relative 0 0 - 6 %    Basophils Relative 1 0 - 1 %    Neutrophils Absolute 3 37 1 85 - 7 62 Thousands/µL    Immature Grans Absolute 0 03 0 00 - 0 20 Thousand/uL    Lymphocytes Absolute 2 41 0 60 - 4 47 Thousands/µL    Monocytes Absolute 0 74 0 17 - 1 22 Thousand/µL    Eosinophils Absolute 0 02 0 00 - 0 61 Thousand/µL    Basophils Absolute 0 03 0 00 - 0 10 Thousands/µL   Lipid panel    Collection Time: 12/12/22  1:48 PM   Result Value Ref Range    Cholesterol 228 (H) See Comment mg/dL    Triglycerides 110 See Comment mg/dL    HDL, Direct 60 >=40 mg/dL    LDL Calculated 146 (H) 0 - 100 mg/dL    Non-HDL-Chol (CHOL-HDL) 168 mg/dl   PSA Total, Diagnostic    Collection Time: 12/12/22  1:48 PM   Result Value Ref Range    PSA, Diagnostic 3 7 0 0 - 4 0 ng/mL   Comprehensive metabolic panel    Collection Time: 12/12/22  1:48 PM   Result Value Ref Range    Sodium 137 135 - 147 mmol/L    Potassium 4 0 3 5 - 5 3 mmol/L    Chloride 107 96 - 108 mmol/L    CO2 26 21 - 32 mmol/L    ANION GAP 4 4 - 13 mmol/L    BUN 20 5 - 25 mg/dL    Creatinine 1 01 0 60 - 1 30 mg/dL    Glucose, Fasting 101 (H) 65 - 99 mg/dL    Calcium 9 8 8 3 - 10 1 mg/dL    AST 28 5 - 45 U/L    ALT 75 12 - 78 U/L    Alkaline Phosphatase 65 46 - 116 U/L    Total Protein 8 0 6 4 - 8 4 g/dL    Albumin 4 3 3 5 - 5 0 g/dL    Total Bilirubin 0 58 0 20 - 1 00 mg/dL    eGFR 87 ml/min/1 73sq m   HIV 1/2 Antigen/Antibody (4th Generation) w Reflex [facility]    Collection Time: 12/12/22  1:48 PM   Result Value Ref Range    HIV-1/HIV-2 Ab Non-Reactive Non-Reactive   Hepatitis C antibody    Collection Time: 12/12/22  1:48 PM   Result Value Ref Range    Hepatitis C Ab Non-reactive Non-reactive            Physical Exam  Constitutional:       General: He is not in acute distress  Appearance: He is well-developed  He is not diaphoretic  HENT:      Head: Normocephalic and atraumatic        Right Ear: External ear normal       Left Ear: External ear normal       Nose: Nose normal       Mouth/Throat:      Pharynx: No oropharyngeal exudate  Eyes:      General:         Right eye: No discharge  Left eye: No discharge  Conjunctiva/sclera: Conjunctivae normal       Pupils: Pupils are equal, round, and reactive to light  Neck:      Thyroid: No thyromegaly  Cardiovascular:      Rate and Rhythm: Normal rate and regular rhythm  Heart sounds: Normal heart sounds  No murmur heard  No friction rub  No gallop  Pulmonary:      Effort: Pulmonary effort is normal  No respiratory distress  Breath sounds: Normal breath sounds  No stridor  No wheezing or rales  Abdominal:      General: Bowel sounds are normal  There is no distension  Palpations: Abdomen is soft  Tenderness: There is no abdominal tenderness  Musculoskeletal:      Cervical back: Normal range of motion and neck supple  Lymphadenopathy:      Cervical: No cervical adenopathy  Skin:     General: Skin is warm and dry  Findings: No erythema or rash  Neurological:      Mental Status: He is alert and oriented to person, place, and time  Psychiatric:         Behavior: Behavior normal          Thought Content:  Thought content normal          Judgment: Judgment normal

## 2023-01-11 ENCOUNTER — APPOINTMENT (OUTPATIENT)
Dept: LAB | Facility: IMAGING CENTER | Age: 48
End: 2023-01-11

## 2023-01-11 ENCOUNTER — OFFICE VISIT (OUTPATIENT)
Dept: INTERNAL MEDICINE CLINIC | Facility: OTHER | Age: 48
End: 2023-01-11

## 2023-01-11 VITALS
DIASTOLIC BLOOD PRESSURE: 88 MMHG | OXYGEN SATURATION: 96 % | WEIGHT: 203.4 LBS | HEIGHT: 72 IN | TEMPERATURE: 98.4 F | BODY MASS INDEX: 27.55 KG/M2 | SYSTOLIC BLOOD PRESSURE: 120 MMHG | HEART RATE: 88 BPM

## 2023-01-11 DIAGNOSIS — R00.2 PALPITATIONS: ICD-10-CM

## 2023-01-11 DIAGNOSIS — I10 ESSENTIAL HYPERTENSION: ICD-10-CM

## 2023-01-11 DIAGNOSIS — Z12.11 ENCOUNTER FOR SCREENING FOR MALIGNANT NEOPLASM OF COLON: Primary | ICD-10-CM

## 2023-01-11 DIAGNOSIS — E78.2 MIXED HYPERLIPIDEMIA: ICD-10-CM

## 2023-01-11 DIAGNOSIS — R73.01 ELEVATED FASTING GLUCOSE: ICD-10-CM

## 2023-01-11 LAB
BASOPHILS # BLD AUTO: 0.03 THOUSANDS/ÂΜL (ref 0–0.1)
BASOPHILS NFR BLD AUTO: 1 % (ref 0–1)
EOSINOPHIL # BLD AUTO: 0.02 THOUSAND/ÂΜL (ref 0–0.61)
EOSINOPHIL NFR BLD AUTO: 0 % (ref 0–6)
ERYTHROCYTE [DISTWIDTH] IN BLOOD BY AUTOMATED COUNT: 11.9 % (ref 11.6–15.1)
HCT VFR BLD AUTO: 45.4 % (ref 36.5–49.3)
HGB BLD-MCNC: 15.4 G/DL (ref 12–17)
IMM GRANULOCYTES # BLD AUTO: 0.04 THOUSAND/UL (ref 0–0.2)
IMM GRANULOCYTES NFR BLD AUTO: 1 % (ref 0–2)
LYMPHOCYTES # BLD AUTO: 2.43 THOUSANDS/ÂΜL (ref 0.6–4.47)
LYMPHOCYTES NFR BLD AUTO: 38 % (ref 14–44)
MCH RBC QN AUTO: 30.8 PG (ref 26.8–34.3)
MCHC RBC AUTO-ENTMCNC: 33.9 G/DL (ref 31.4–37.4)
MCV RBC AUTO: 91 FL (ref 82–98)
MONOCYTES # BLD AUTO: 0.67 THOUSAND/ÂΜL (ref 0.17–1.22)
MONOCYTES NFR BLD AUTO: 11 % (ref 4–12)
NEUTROPHILS # BLD AUTO: 3.2 THOUSANDS/ÂΜL (ref 1.85–7.62)
NEUTS SEG NFR BLD AUTO: 49 % (ref 43–75)
NRBC BLD AUTO-RTO: 0 /100 WBCS
PLATELET # BLD AUTO: 342 THOUSANDS/UL (ref 149–390)
PMV BLD AUTO: 10.2 FL (ref 8.9–12.7)
RBC # BLD AUTO: 5 MILLION/UL (ref 3.88–5.62)
TSH SERPL DL<=0.05 MIU/L-ACNC: 1.37 UIU/ML (ref 0.45–4.5)
WBC # BLD AUTO: 6.39 THOUSAND/UL (ref 4.31–10.16)

## 2023-01-11 RX ORDER — LISINOPRIL AND HYDROCHLOROTHIAZIDE 12.5; 1 MG/1; MG/1
1 TABLET ORAL DAILY
Qty: 90 TABLET | Refills: 1 | Status: SHIPPED | OUTPATIENT
Start: 2023-01-11

## 2023-01-11 NOTE — ASSESSMENT & PLAN NOTE
Start red yeast rice, continue fish oil  Recommend healthy lifestyle choices for your cholesterol  Low fat/low cholesterol diet  Limit/avoid red meat  Eat more lean meat - chicken breast, ground turkey, fish  Exercise 30 mins at least 5 times a week as tolerated

## 2023-01-11 NOTE — ASSESSMENT & PLAN NOTE
Patient is to continue to work on diet and exercise  Limit sugars and carbohydrate intake  Avoid soda, juice, sweets, cookies, desserts, pasta, bread    Eat more whole grains, exercised 30 min of cardio at least 3 times a week

## 2023-01-13 ENCOUNTER — HOSPITAL ENCOUNTER (OUTPATIENT)
Facility: MEDICAL CENTER | Age: 48
Discharge: HOME | End: 2023-01-13
Attending: UROLOGY

## 2023-01-13 DIAGNOSIS — R97.20 ELEVATED PSA: ICD-10-CM

## 2023-01-13 RX ADMIN — GADOBUTROL 9 ML: 604.72 INJECTION INTRAVENOUS at 12:56

## 2023-01-20 DIAGNOSIS — R97.20 ELEVATED PSA: Primary | ICD-10-CM

## 2023-01-20 NOTE — PROGRESS NOTES
Spoke with patient via telephone from 5:55 PM to 6:12 PM 1/20/2023  The patient had a leave the office prior to his appointment because of a previous commitment  I discussed his PI-RADS 2 MRI and is elevated for age 2 7 PSA  At the present time the plan will be to repeat PSA in 6 months and if it is still abnormal consider repeat MRI  I did discuss non fusion transrectal ultrasound-guided biopsies for   in the future if his PSA is still concerning and his MRI does not yield any lesion for guidance during fusion biopsy  The patient will return in 6 months discussed PSA and other symptomatology

## 2023-02-03 ENCOUNTER — OFFICE VISIT (OUTPATIENT)
Dept: INTERNAL MEDICINE CLINIC | Facility: OTHER | Age: 48
End: 2023-02-03

## 2023-02-03 VITALS
HEIGHT: 72 IN | BODY MASS INDEX: 28.04 KG/M2 | WEIGHT: 207 LBS | OXYGEN SATURATION: 99 % | DIASTOLIC BLOOD PRESSURE: 70 MMHG | TEMPERATURE: 97.4 F | HEART RATE: 66 BPM | SYSTOLIC BLOOD PRESSURE: 118 MMHG

## 2023-02-03 DIAGNOSIS — R30.0 PAINFUL URGING TO URINATE: Primary | ICD-10-CM

## 2023-02-03 DIAGNOSIS — K52.9 GASTROENTERITIS: ICD-10-CM

## 2023-02-03 LAB
SL AMB  POCT GLUCOSE, UA: NORMAL
SL AMB LEUKOCYTE ESTERASE,UA: NORMAL
SL AMB POCT BILIRUBIN,UA: NORMAL
SL AMB POCT BLOOD,UA: NORMAL
SL AMB POCT CLARITY,UA: CLEAR
SL AMB POCT COLOR,UA: YELLOW
SL AMB POCT KETONES,UA: NORMAL
SL AMB POCT NITRITE,UA: NORMAL
SL AMB POCT PH,UA: 6.5
SL AMB POCT SPECIFIC GRAVITY,UA: 1.02
SL AMB POCT URINE PROTEIN: NORMAL
SL AMB POCT UROBILINOGEN: NORMAL

## 2023-02-03 NOTE — PROGRESS NOTES
Assessment/Plan:    Problem List Items Addressed This Visit        Digestive    Gastroenteritis     - symptoms significantly improved  -Recommend bland diet, adequate hydration  -Offered dicyclomine but patient does not feel he needs any medication at this time  -Follow-up for any worsening symptoms  - UA normal         Other Visit Diagnoses     Painful urging to urinate    -  Primary    Relevant Orders    POCT urine dip (Completed)        M*Modal software was used to dictate this note  It may contain errors with dictating incorrect words or incorrect spelling  Please contact the provider directly with any questions  Subjective:      Patient ID: Anthony Massey is a 49 y o  male  HPI    Patient presents today with abdominal discomfort  Symptoms started 5 days ago with pain and diarrhea  He believes the pain was mostly lower abdomen  He felt nauseous the following day like he was going to through up  His pain persisted but became intermittent primarily before he needed to have a bowel movement  Yesterday he had 1 episode of pain with urination  He thought he was having a kidney stone which he has had in the past  Urinary symptoms resolved  Today he feel feels significantly improved but does continue with a mild lower abdominal cramping prior to bowel movements  Diarrhea is resolved, bowels are solid  He is following a vegetarian diet and he is back to tolerating food well  The following portions of the patient's history were reviewed and updated as appropriate: allergies, current medications, past family history, past medical history, past social history, past surgical history and problem list     Review of Systems   Constitutional: Negative for chills and fever  Gastrointestinal: Positive for abdominal pain, diarrhea and nausea  Negative for blood in stool, constipation and vomiting  Genitourinary: Negative for difficulty urinating and dysuria           Past Medical History:   Diagnosis Date • Anxiety    • BPH with obstruction/lower urinary tract symptoms 2015   • Cervicalgia    • Corporo-venous occlusive erectile dysfunction 2017   • Epididymitis 2017   • Erectile dysfunction 2014   • Hypertension    • Kidney stone    • OAB (overactive bladder)    • Positive depression screening 5/7/2019   • Sore throat (viral) 9/16/2019   • Urge incontinence 2014   • Urgency of urination 2017         Current Outpatient Medications:   •  ALPRAZolam (XANAX) 0 5 mg tablet, Take 1 tablet (0 5 mg total) by mouth daily as needed for anxiety (for severe anxiety), Disp: 15 tablet, Rfl: 0  •  Ascorbic Acid (VITAMIN C) 1000 MG tablet, Take 1,000 mg by mouth daily, Disp: , Rfl:   •  B Complex Vitamins (B COMPLEX 1 PO), Take by mouth daily , Disp: , Rfl:   •  Cholecalciferol (VITAMIN D) 2000 units CAPS, Take 1 capsule by mouth daily, Disp: , Rfl:   •  lisinopril-hydrochlorothiazide (PRINZIDE,ZESTORETIC) 10-12 5 MG per tablet, Take 1 tablet by mouth daily, Disp: 90 tablet, Rfl: 1  •  Omega-3 Fatty Acids (FISH OIL) 1000 MG CPDR, Take by mouth daily , Disp: , Rfl:   •  tadalafil (CIALIS) 20 MG tablet, Take 1 tablet by mouth one (1) hour prior to intercourse on an empty stomach , Disp: 10 tablet, Rfl: 5  •  Zinc 100 MG TABS, Take 1 tablet by mouth daily, Disp: , Rfl:     No Known Allergies    Social History   Past Surgical History:   Procedure Laterality Date   • HERNIA REPAIR     • [place] ARTHRS KNE SURG W/MENISCECTOMY MED/LAT W/SHVG Left 12/11/2020    Procedure: KNEE ARTHROSCOPIC MENISCECTOMY MEDIAL;  Surgeon: Marilou Lama MD;  [facility];  Service: Orthopedics   • VASECTOMY       Family History   Problem Relation Age of Onset   • Lung cancer Father    • Cancer Father    • Lung cancer Mother    • Cancer Mother    • Cancer Maternal Grandmother         unknown   • Heart disease Maternal Grandfather    • Prostate cancer Maternal Grandfather    • Cancer Maternal Grandfather        Objective:  /70 (BP Location: Left arm, Patient Position: Sitting, Cuff Size: Large)   Pulse 66   Temp (!) 97 4 °F (36 3 °C) (Temporal)   Ht 6' (1 829 m)   Wt 93 9 kg (207 lb)   SpO2 99%   BMI 28 07 kg/m²      Physical Exam  Vitals reviewed  Constitutional:       General: He is not in acute distress  Appearance: Normal appearance  He is not diaphoretic  HENT:      Head: Normocephalic and atraumatic  Eyes:      Extraocular Movements: Extraocular movements intact  Conjunctiva/sclera: Conjunctivae normal       Pupils: Pupils are equal, round, and reactive to light  Cardiovascular:      Rate and Rhythm: Normal rate and regular rhythm  Heart sounds: Normal heart sounds  No murmur heard  Pulmonary:      Effort: Pulmonary effort is normal  No respiratory distress  Breath sounds: Normal breath sounds  No wheezing, rhonchi or rales  Abdominal:      General: Bowel sounds are increased  There is no distension  Palpations: Abdomen is soft  There is no mass  Tenderness: There is no abdominal tenderness  There is no right CVA tenderness, left CVA tenderness or guarding  Neurological:      Mental Status: He is alert and oriented to person, place, and time  Mental status is at baseline     Psychiatric:         Mood and Affect: Mood normal          Behavior: Behavior normal

## 2023-02-03 NOTE — ASSESSMENT & PLAN NOTE
- symptoms significantly improved  -Recommend bland diet, adequate hydration  -Offered dicyclomine but patient does not feel he needs any medication at this time  -Follow-up for any worsening symptoms  - UA normal

## 2023-04-04 PROBLEM — K52.9 GASTROENTERITIS: Status: RESOLVED | Noted: 2023-02-03 | Resolved: 2023-04-04

## 2023-07-19 ENCOUNTER — RA CDI HCC (OUTPATIENT)
Dept: OTHER | Facility: HOSPITAL | Age: 48
End: 2023-07-19

## 2023-07-19 NOTE — PROGRESS NOTES
[address] [facility] coding opportunities       Chart reviewed, no opportunity found: CHART REVIEWED, NO OPPORTUNITY FOUND        Patients Insurance        Commercial Insurance: [employer]

## 2023-07-29 ENCOUNTER — APPOINTMENT (OUTPATIENT)
Dept: LAB | Facility: IMAGING CENTER | Age: 48
End: 2023-07-29
Payer: COMMERCIAL

## 2023-07-29 DIAGNOSIS — R97.20 ELEVATED PSA: ICD-10-CM

## 2023-07-29 LAB
ALBUMIN SERPL BCP-MCNC: 4 G/DL (ref 3.5–5)
ALP SERPL-CCNC: 50 U/L (ref 46–116)
ALT SERPL W P-5'-P-CCNC: 79 U/L (ref 12–78)
ANION GAP SERPL CALCULATED.3IONS-SCNC: 4 MMOL/L
AST SERPL W P-5'-P-CCNC: 36 U/L (ref 5–45)
BILIRUB SERPL-MCNC: 0.94 MG/DL (ref 0.2–1)
BUN SERPL-MCNC: 12 MG/DL (ref 5–25)
CALCIUM SERPL-MCNC: 9.6 MG/DL (ref 8.3–10.1)
CHLORIDE SERPL-SCNC: 107 MMOL/L (ref 96–108)
CHOLEST SERPL-MCNC: 204 MG/DL
CO2 SERPL-SCNC: 28 MMOL/L (ref 21–32)
CREAT SERPL-MCNC: 0.98 MG/DL (ref 0.6–1.3)
GFR SERPL CREATININE-BSD FRML MDRD: 90 ML/MIN/1.73SQ M
GLUCOSE P FAST SERPL-MCNC: 103 MG/DL (ref 65–99)
HDLC SERPL-MCNC: 53 MG/DL
LDLC SERPL CALC-MCNC: 109 MG/DL (ref 0–100)
NONHDLC SERPL-MCNC: 151 MG/DL
POTASSIUM SERPL-SCNC: 4.3 MMOL/L (ref 3.5–5.3)
PROT SERPL-MCNC: 7.4 G/DL (ref 6.4–8.4)
SODIUM SERPL-SCNC: 139 MMOL/L (ref 135–147)
TRIGL SERPL-MCNC: 209 MG/DL

## 2023-07-29 PROCEDURE — 36415 COLL VENOUS BLD VENIPUNCTURE: CPT

## 2023-07-29 PROCEDURE — 84154 ASSAY OF PSA FREE: CPT

## 2023-07-29 PROCEDURE — 84153 ASSAY OF PSA TOTAL: CPT

## 2023-07-31 ENCOUNTER — TELEPHONE (OUTPATIENT)
Age: 48
End: 2023-07-31

## 2023-07-31 ENCOUNTER — PREP FOR PROCEDURE (OUTPATIENT)
Age: 48
End: 2023-07-31

## 2023-07-31 DIAGNOSIS — Z12.11 SCREENING FOR COLON CANCER: Primary | ICD-10-CM

## 2023-07-31 LAB
PSA FREE MFR SERPL: 14.8 %
PSA FREE SERPL-MCNC: 0.37 NG/ML
PSA SERPL-MCNC: 2.5 NG/ML (ref 0–4)

## 2023-07-31 NOTE — TELEPHONE ENCOUNTER
07/31/23  Screened by: Dixon Khan    Referring Provider: Winnie Smallwood    Pre- Screening: Screening for Colon Cancer    Weight: 202lbs  Height: 6'  BMI:27.4    Has patient been referred for a routine screening Colonoscopy? yes  Is the patient between 43-73 years old? yes      Previous Colonoscopy no      SCHEDULING STAFF: If the patient is between 45yrs-49yrs, please advise patient to confirm benefits/coverage with their insurance company for a routine screening colonoscopy, some insurance carriers will only cover at [address] [place] [address] or older. If the patient is over 66years old, please schedule an office visit. Does the patient want to see a Gastroenterologist prior to their procedure OR are they having any GI symptoms? no    Has the patient been hospitalized or had abdominal surgery in the past 6 months? no    Does the patient use supplemental oxygen? no    Does the patient take Coumadin, Lovenox, Plavix, Elliquis, Xarelto, or other blood thinning medication? no    Has the patient had a stroke, cardiac event, or stent placed in the past year? no    SCHEDULING STAFF: If patient answers NO to above questions, then schedule procedure. If patient answers YES to above questions, then schedule office appointment. PT PASSED OA    If patient is between 45yrs - 49yrs, please advise patient that we will have to confirm benefits & coverage with their insurance company for a routine screening colonoscopy.

## 2023-07-31 NOTE — TELEPHONE ENCOUNTER
Scheduled date of colonoscopy (as of today): 10/02/2023    Physician performing colonoscopy: Dr. Justina Montejo    Location of colonoscopy: [facility]    Bowel prep reviewed with patient: Chela     Instructions reviewed with patient by: Leila Prabhakar    Clearances: None

## 2023-08-09 ENCOUNTER — OFFICE VISIT (OUTPATIENT)
Dept: INTERNAL MEDICINE CLINIC | Facility: OTHER | Age: 48
End: 2023-08-09
Payer: COMMERCIAL

## 2023-08-09 VITALS
BODY MASS INDEX: 27.9 KG/M2 | OXYGEN SATURATION: 98 % | TEMPERATURE: 98.1 F | HEART RATE: 73 BPM | DIASTOLIC BLOOD PRESSURE: 80 MMHG | HEIGHT: 72 IN | SYSTOLIC BLOOD PRESSURE: 112 MMHG | WEIGHT: 206 LBS

## 2023-08-09 DIAGNOSIS — E78.2 MIXED HYPERLIPIDEMIA: Primary | ICD-10-CM

## 2023-08-09 DIAGNOSIS — I10 ESSENTIAL HYPERTENSION: ICD-10-CM

## 2023-08-09 DIAGNOSIS — R73.01 ELEVATED FASTING GLUCOSE: ICD-10-CM

## 2023-08-09 DIAGNOSIS — R00.2 PALPITATIONS: ICD-10-CM

## 2023-08-09 PROCEDURE — 99214 OFFICE O/P EST MOD 30 MIN: CPT | Performed by: NURSE PRACTITIONER

## 2023-08-09 NOTE — ASSESSMENT & PLAN NOTE
Start red yeast rice, continue fish oil. Recommend healthy lifestyle choices for your cholesterol. Low fat/low cholesterol diet. Limit/avoid red meat. Eat more lean meat - chicken breast, ground turkey, fish. Exercise 30 mins at least 5 times a week as tolerated.

## 2023-08-09 NOTE — PROGRESS NOTES
Assessment/Plan:    Essential hypertension  Controlled on Lisinopril- HCTZ. Elevated fasting glucose  Patient is to continue to work on diet and exercise. Limit sugars and carbohydrate intake. Avoid soda, juice, sweets, cookies, desserts, pasta, bread.   Eat more whole grains, exercised 30 min of cardio at least 3 times a week. Mixed hyperlipidemia  Start red yeast rice, continue fish oil. Recommend healthy lifestyle choices for your cholesterol. Low fat/low cholesterol diet. Limit/avoid red meat. Eat more lean meat - chicken breast, ground turkey, fish. Exercise 30 mins at least 5 times a week as tolerated. Palpitations  Get Holtor monitor which was previously ordered. BMI Counseling: Body mass index is 27.94 kg/m². The BMI is above normal. Nutrition recommendations include decreasing portion sizes, encouraging healthy choices of fruits and vegetables, decreasing fast food intake, consuming healthier snacks, limiting drinks that contain sugar, moderation in carbohydrate intake, increasing intake of lean protein, reducing intake of saturated and trans fat and reducing intake of cholesterol. Exercise recommendations include moderate physical activity 150 minutes/week and exercising 3-5 times per week. Rationale for BMI follow-up plan is due to patient being overweight or obese. Diagnoses and all orders for this visit:    Mixed hyperlipidemia  -     CBC and differential; Future  -     Comprehensive metabolic panel; Future  -     Lipid panel; Future    Elevated fasting glucose  -     Hemoglobin A1C    Essential hypertension    Palpitations    Other orders  -     Coenzyme Q10 (CO Q 10 PO); Take by mouth in the morning  -     Red Yeast Rice Extract (RED YEAST RICE PO); Take 2 tablets by mouth in the morning          Subjective:      Patient ID: Emma Harris is a 49 y.o. male. Patient presents today for review of blood work and follow up on HTN.        HTN-well controlled on  lisinopril-HCTZ t, he denies side effects with this medication, denies chest pain and shortness of breath    HLD- , Cholesterol 204 ,triglycerdies 209 ASCVD 2.5%, improving     GI Problem-Has been having diarrhea on & off for a few years especially after eating anything. It's gotten worse in the last year. He eats and then he has to run to the bathroom           The following portions of the patient's history were reviewed and updated as appropriate: allergies, current medications, past family history, past medical history, past social history, past surgical history and problem list.    Review of Systems   Constitutional: Negative for activity change, appetite change, chills, diaphoresis and fever. HENT: Negative for congestion, ear discharge, ear pain, postnasal drip, rhinorrhea, sinus pressure, sinus pain and sore throat. Eyes: Negative for pain, discharge, itching and visual disturbance. Respiratory: Negative for cough, chest tightness, shortness of breath and wheezing. Cardiovascular: Negative for chest pain, palpitations and leg swelling. Gastrointestinal: Negative for abdominal pain, constipation, diarrhea, nausea and vomiting. Endocrine: Negative for polydipsia, polyphagia and polyuria. Genitourinary: Negative for difficulty urinating, dysuria and urgency. Musculoskeletal: Negative for arthralgias, back pain and neck pain. Skin: Negative for rash and wound. Neurological: Negative for dizziness, weakness, numbness and headaches.          Past Medical History:   Diagnosis Date   • Anxiety    • BPH with obstruction/lower urinary tract symptoms 2015   • Cervicalgia    • Corporo-venous occlusive erectile dysfunction 2017   • Epididymitis 2017   • Erectile dysfunction 2014   • Hypertension    • Kidney stone    • OAB (overactive bladder)    • Positive depression screening 5/7/2019   • Sore throat (viral) 9/16/2019   • Urge incontinence 2014   • Urgency of urination 2017 Current Outpatient Medications:   •  ALPRAZolam (XANAX) 0.5 mg tablet, Take 1 tablet (0.5 mg total) by mouth daily as needed for anxiety (for severe anxiety), Disp: 15 tablet, Rfl: 0  •  Ascorbic Acid (VITAMIN C) 1000 MG tablet, Take 1,000 mg by mouth daily, Disp: , Rfl:   •  B Complex Vitamins (B COMPLEX 1 PO), Take by mouth daily , Disp: , Rfl:   •  Cholecalciferol (VITAMIN D) 2000 units CAPS, Take 1 capsule by mouth daily, Disp: , Rfl:   •  Coenzyme Q10 (CO Q 10 PO), Take by mouth in the morning, Disp: , Rfl:   •  lisinopril-hydrochlorothiazide (PRINZIDE,ZESTORETIC) 10-12.5 MG per tablet, Take 1 tablet by mouth daily, Disp: 90 tablet, Rfl: 1  •  Omega-3 Fatty Acids (FISH OIL) 1000 MG CPDR, Take by mouth daily , Disp: , Rfl:   •  Red Yeast Rice Extract (RED YEAST RICE PO), Take 2 tablets by mouth in the morning, Disp: , Rfl:   •  tadalafil (CIALIS) 20 MG tablet, Take 1 tablet by mouth one (1) hour prior to intercourse on an empty stomach., Disp: 10 tablet, Rfl: 5  •  Zinc 100 MG TABS, Take 1 tablet by mouth daily, Disp: , Rfl:   •  polyethylene glycol (GOLYTELY) 4000 mL solution, Take as directed by the office.  (Patient not taking: Reported on 8/9/2023), Disp: 4000 mL, Rfl: 0    No Known Allergies    Social History   Past Surgical History:   Procedure Laterality Date   • HERNIA REPAIR     • [place] ARTHRS KNE SURG W/MENISCECTOMY MED/LAT W/SHVG Left 12/11/2020    Procedure: KNEE ARTHROSCOPIC MENISCECTOMY MEDIAL;  Surgeon: Lui Sagastume MD;  Location: [facility];  Service: Orthopedics   • VASECTOMY       Family History   Problem Relation Age of Onset   • Lung cancer Father    • Cancer Father    • Lung cancer Mother    • Cancer Mother    • Cancer Maternal Grandmother         unknown   • Heart disease Maternal Grandfather    • Prostate cancer Maternal Grandfather    • Cancer Maternal Grandfather        Objective:  /80 (BP Location: Left arm, Patient Position: Sitting, Cuff Size: Adult)   Pulse 73 Temp 98.1 °F (36.7 °C) (Temporal)   Ht 6' (1.829 m)   Wt 93.4 kg (206 lb)   SpO2 98%   BMI 27.94 kg/m²     Recent Results (from the past 1344 hour(s))   Lipid panel    Collection Time: 07/29/23 11:33 AM   Result Value Ref Range    Cholesterol 204 (H) See Comment mg/dL    Triglycerides 209 (H) See Comment mg/dL    HDL, Direct 53 >=40 mg/dL    LDL Calculated 109 (H) 0 - 100 mg/dL    Non-HDL-Chol (CHOL-HDL) 151 mg/dl   Comprehensive metabolic panel    Collection Time: 07/29/23 11:33 AM   Result Value Ref Range    Sodium 139 135 - 147 mmol/L    Potassium 4.3 3.5 - 5.3 mmol/L    Chloride 107 96 - 108 mmol/L    CO2 28 21 - 32 mmol/L    ANION GAP 4 mmol/L    BUN 12 5 - 25 mg/dL    Creatinine 0.98 0.60 - 1.30 mg/dL    Glucose, Fasting 103 (H) 65 - 99 mg/dL    Calcium 9.6 8.3 - 10.1 mg/dL    AST 36 5 - 45 U/L    ALT 79 (H) 12 - 78 U/L    Alkaline Phosphatase 50 46 - 116 U/L    Total Protein 7.4 6.4 - 8.4 g/dL    Albumin 4.0 3.5 - 5.0 g/dL    Total Bilirubin 0.94 0.20 - 1.00 mg/dL    eGFR 90 ml/min/1.73sq m   PSA, total and free    Collection Time: 07/29/23 11:33 AM   Result Value Ref Range    Prostate Specific Antigen Total 2.5 0.0 - 4.0 ng/mL    PSA, Free 0.37 N/A ng/mL    PSA, Free Pct 14.8 %            Physical Exam  Constitutional:       General: He is not in acute distress. Appearance: He is well-developed. He is not diaphoretic. HENT:      Head: Normocephalic and atraumatic. Right Ear: External ear normal.      Left Ear: External ear normal.      Nose: Nose normal.      Mouth/Throat:      Pharynx: No oropharyngeal exudate. Eyes:      General:         Right eye: No discharge. Left eye: No discharge. Conjunctiva/sclera: Conjunctivae normal.      Pupils: Pupils are equal, round, and reactive to light. Neck:      Thyroid: No thyromegaly. Cardiovascular:      Rate and Rhythm: Normal rate and regular rhythm. Heart sounds: Normal heart sounds. No murmur heard. No friction rub.  No gallop. Pulmonary:      Effort: Pulmonary effort is normal. No respiratory distress. Breath sounds: Normal breath sounds. No stridor. No wheezing or rales. Abdominal:      General: Bowel sounds are normal. There is no distension. Palpations: Abdomen is soft. Tenderness: There is no abdominal tenderness. Musculoskeletal:      Cervical back: Normal range of motion and neck supple. Lymphadenopathy:      Cervical: No cervical adenopathy. Skin:     General: Skin is warm and dry. Findings: No erythema or rash. Neurological:      Mental Status: He is alert and oriented to person, place, and time. Psychiatric:         Behavior: Behavior normal.         Thought Content:  Thought content normal.         Judgment: Judgment normal.

## 2023-08-09 NOTE — ASSESSMENT & PLAN NOTE
Patient is to continue to work on diet and exercise. Limit sugars and carbohydrate intake. Avoid soda, juice, sweets, cookies, desserts, pasta, bread.   Eat more whole grains, exercised 30 min of cardio at least 3 times a week.

## 2023-09-28 RX ORDER — SODIUM CHLORIDE 9 MG/ML
125 INJECTION, SOLUTION INTRAVENOUS CONTINUOUS
Status: CANCELLED | OUTPATIENT
Start: 2023-09-28

## 2023-10-02 ENCOUNTER — ANESTHESIA EVENT (OUTPATIENT)
Dept: GASTROENTEROLOGY | Facility: MEDICAL CENTER | Age: 48
End: 2023-10-02

## 2023-10-02 ENCOUNTER — ANESTHESIA (OUTPATIENT)
Dept: GASTROENTEROLOGY | Facility: MEDICAL CENTER | Age: 48
End: 2023-10-02

## 2023-10-02 ENCOUNTER — HOSPITAL ENCOUNTER (OUTPATIENT)
Dept: GASTROENTEROLOGY | Facility: MEDICAL CENTER | Age: 48
Setting detail: OUTPATIENT SURGERY
Discharge: HOME | End: 2023-10-02
Attending: INTERNAL MEDICINE
Payer: COMMERCIAL

## 2023-10-02 VITALS
OXYGEN SATURATION: 100 % | WEIGHT: 205.91 LBS | BODY MASS INDEX: 27.89 KG/M2 | HEIGHT: 72 IN | HEART RATE: 59 BPM | TEMPERATURE: 97.9 F | RESPIRATION RATE: 18 BRPM | SYSTOLIC BLOOD PRESSURE: 150 MMHG | DIASTOLIC BLOOD PRESSURE: 97 MMHG

## 2023-10-02 DIAGNOSIS — Z12.11 SCREENING FOR COLON CANCER: ICD-10-CM

## 2023-10-02 PROCEDURE — 88305 TISSUE EXAM BY PATHOLOGIST: CPT | Performed by: PATHOLOGY

## 2023-10-02 RX ORDER — SODIUM CHLORIDE 9 MG/ML
125 INJECTION, SOLUTION INTRAVENOUS CONTINUOUS
Status: DISCONTINUED | OUTPATIENT
Start: 2023-10-02 | End: 2023-10-06 | Stop reason: HOSPADM

## 2023-10-02 RX ORDER — LIDOCAINE HYDROCHLORIDE 20 MG/ML
INJECTION, SOLUTION EPIDURAL; INFILTRATION; INTRACAUDAL; PERINEURAL AS NEEDED
Status: DISCONTINUED | OUTPATIENT
Start: 2023-10-02 | End: 2023-10-02

## 2023-10-02 RX ORDER — PROPOFOL 10 MG/ML
INJECTION, EMULSION INTRAVENOUS AS NEEDED
Status: DISCONTINUED | OUTPATIENT
Start: 2023-10-02 | End: 2023-10-02

## 2023-10-02 RX ADMIN — SODIUM CHLORIDE 125 ML/HR: 0.9 INJECTION, SOLUTION INTRAVENOUS at 10:39

## 2023-10-02 RX ADMIN — PROPOFOL 100 MG: 10 INJECTION, EMULSION INTRAVENOUS at 10:55

## 2023-10-02 RX ADMIN — PROPOFOL 150 MG: 10 INJECTION, EMULSION INTRAVENOUS at 10:49

## 2023-10-02 RX ADMIN — LIDOCAINE HYDROCHLORIDE 5 ML: 20 INJECTION, SOLUTION EPIDURAL; INFILTRATION; INTRACAUDAL at 10:49

## 2023-10-02 RX ADMIN — PROPOFOL 50 MG: 10 INJECTION, EMULSION INTRAVENOUS at 10:51

## 2023-10-02 NOTE — ANESTHESIA PREPROCEDURE EVALUATION
Procedure:  COLONOSCOPY    Relevant Problems   CARDIO   (+) Essential hypertension   (+) Mixed hyperlipidemia      GI/HEPATIC   (+) Other chronic nonalcoholic liver disease      NEURO/PSYCH   (+) CONNER (generalized anxiety disorder)        Physical Exam    Airway    Mallampati score: II  TM Distance: >3 FB  Neck ROM: full     Dental   No notable dental hx     Cardiovascular  Cardiovascular exam normal    Pulmonary      Other Findings        Anesthesia Plan  ASA Score- 2     Anesthesia Type- IV sedation with anesthesia with ASA Monitors. Additional Monitors:   Airway Plan:           Plan Factors-Exercise tolerance (METS): >4 METS. Chart reviewed. Patient is not a current smoker. Patient instructed to abstain from smoking on day of procedure. Patient did not smoke on day of surgery. Obstructive sleep apnea risk education given perioperatively. Induction- intravenous. Postoperative Plan-     Informed Consent- Anesthetic plan and risks discussed with patient.

## 2023-10-02 NOTE — ANESTHESIA POSTPROCEDURE EVALUATION
Post-Op Assessment Note    CV Status:  Stable    Pain management: adequate     Mental Status:  Alert and awake   Hydration Status:  Euvolemic   PONV Controlled:  Controlled   Airway Patency:  Patent      Post Op Vitals Reviewed: Yes            No notable events documented.     BP      Temp      Pulse     Resp      SpO2      /97   Pulse 59   Temp 97.9 °F (36.6 °C) (Temporal)   Resp 18   Ht 6' (1.829 m)   Wt 93.4 kg (205 lb 14.6 oz)   SpO2 100%   BMI 27.93 kg/m²

## 2023-10-02 NOTE — H&P
History and Physical -  [facility]  Angelina Agarwal 49 y.o. male MRN: [id number]    HPI: Angelina Agarwal is a 49y.o. year old male who presents with screening - also has midl diarrhea. Review of Systems    Historical Information   Past Medical History:   Diagnosis Date   • Anxiety    • BPH with obstruction/lower urinary tract symptoms 2015   • Cervicalgia    • Corporo-venous occlusive erectile dysfunction 2017   • Epididymitis 2017   • Erectile dysfunction 2014   • Hypertension    • Kidney stone    • OAB (overactive bladder)    • Positive depression screening 5/7/2019   • Sore throat (viral) 9/16/2019   • Urge incontinence 2014   • Urgency of urination 2017     Past Surgical History:   Procedure Laterality Date   • HERNIA REPAIR     • [place] ARTHRS KNE SURG W/MENISCECTOMY MED/LAT W/SHVG Left 12/11/2020    Procedure: KNEE ARTHROSCOPIC MENISCECTOMY MEDIAL;  Surgeon: Ml Galicia MD;  Location: [facility];  Service: Orthopedics   • VASECTOMY       Social History   Social History     Substance and Sexual Activity   Alcohol Use Yes   • Alcohol/week: 8.0 standard drinks of alcohol   • Types: 8 Cans of beer per week    Comment: SOCIALLY     Social History     Substance and Sexual Activity   Drug Use Not Currently   • Types: Marijuana    Comment: Every other day to every 3 days. Social History     Tobacco Use   Smoking Status Never   Smokeless Tobacco Never     Family History   Problem Relation Age of Onset   • Lung cancer Father    • Cancer Father    • Lung cancer Mother    • Cancer Mother    • Cancer Maternal Grandmother         unknown   • Heart disease Maternal Grandfather    • Prostate cancer Maternal Grandfather    • Cancer Maternal Grandfather        Meds/Allergies     (Not in a hospital admission)      No Known Allergies    Objective     There were no vitals taken for this visit.       PHYSICAL EXAM    Gen: NAD  CV: RRR  CHEST: Clear  ABD: soft, NT/ND  EXT: no edema  Neuro: AAO      ASSESSMENT/PLAN:  This is a 49y.o. year old male here for colonoscopty for colon cancer screening / diarrhea intermittently (lose with food).  His grandfather had colon cancer (68)    PLAN:   Procedure: colonoscopy

## 2023-10-04 ENCOUNTER — OFFICE VISIT (OUTPATIENT)
Dept: UROLOGY | Facility: MEDICAL CENTER | Age: 48
End: 2023-10-04
Payer: COMMERCIAL

## 2023-10-04 VITALS
DIASTOLIC BLOOD PRESSURE: 100 MMHG | HEIGHT: 72 IN | HEART RATE: 69 BPM | OXYGEN SATURATION: 96 % | WEIGHT: 212 LBS | SYSTOLIC BLOOD PRESSURE: 150 MMHG | BODY MASS INDEX: 28.71 KG/M2

## 2023-10-04 DIAGNOSIS — N32.81 OVERACTIVE BLADDER: ICD-10-CM

## 2023-10-04 DIAGNOSIS — R97.20 ELEVATED PSA: Primary | ICD-10-CM

## 2023-10-04 DIAGNOSIS — N40.0 BPH WITHOUT OBSTRUCTION/LOWER URINARY TRACT SYMPTOMS: ICD-10-CM

## 2023-10-04 DIAGNOSIS — N52.02 CORPORO-VENOUS OCCLUSIVE ERECTILE DYSFUNCTION: ICD-10-CM

## 2023-10-04 PROCEDURE — 88305 TISSUE EXAM BY PATHOLOGIST: CPT | Performed by: PATHOLOGY

## 2023-10-04 PROCEDURE — 99214 OFFICE O/P EST MOD 30 MIN: CPT | Performed by: UROLOGY

## 2023-10-04 NOTE — PROGRESS NOTES
Assessment/Plan:  #1. Elevated PSA for age. PSA is borderline elevated for 49year-old at 2.5 although this has come down from 3.7. Multiparametric MRI was PI-RADS 2 without dominant lesion. We will continue to follow PSAs yearly. 2.  Erectile dysfunction responsive to Cialis-    3. Overactive bladder symptoms stable not severely problematic    4. BPH-AUA symptom score remains low at 5. No problem-specific Assessment & Plan notes found for this encounter. Diagnoses and all orders for this visit:    Elevated PSA  -     PSA Total, Diagnostic; Future  -     Comprehensive metabolic panel; Future    BPH without obstruction/lower urinary tract symptoms    Corporo-venous occlusive erectile dysfunction    Overactive bladder          Subjective:      Patient ID: Corby Mireles is a 49 y.o. male. HPI  54-year-old male with mild urinary frequency presented with elevated PSA for age at 3.7. Repeat has come down to 2.5 with multiparametric MRI revealing no dominant lesions being PI-RADS 2 . The patient has erectile dysfunction is responsive to Cialis. He reports no side effects. The following portions of the patient's history were reviewed and updated as appropriate: allergies, current medications, past family history, past medical history, past social history, past surgical history and problem list.    Review of Systems   All other systems reviewed and are negative. Objective:      /100 (BP Location: Left arm, Patient Position: Sitting, Cuff Size: Adult)   Pulse 69   Ht 6' (1.829 m)   Wt 96.2 kg (212 lb)   SpO2 96%   BMI 28.75 kg/m²          Physical Exam  Vitals reviewed. Constitutional:       General: He is not in acute distress. Appearance: Normal appearance. He is normal weight. He is not ill-appearing, toxic-appearing or diaphoretic. HENT:      Head: Normocephalic and atraumatic.       Nose: Nose normal.      Mouth/Throat:      Mouth: Mucous membranes are moist.   Eyes: Extraocular Movements: Extraocular movements intact. Pulmonary:      Effort: Pulmonary effort is normal. No respiratory distress. Abdominal:      General: There is no distension. Musculoskeletal:         General: Normal range of motion. Cervical back: Neck supple. Skin:     General: Skin is dry. Neurological:      Mental Status: He is alert and oriented to person, place, and time. Psychiatric:         Mood and Affect: Mood normal.         Behavior: Behavior normal.         Thought Content:  Thought content normal.         Judgment: Judgment normal.

## 2023-10-04 NOTE — RESULT ENCOUNTER NOTE
Inform patient via [employer]. Please review the pathology/lab result of further discussion. Copied from [employer] message :       Maria A Gaston,     Your biopsies in the colon were unremarkable. 2 polyps were seen in the rectum which were removed and were tubular adenomas which is precancerous polyps. Since these were small and completely removed I would recommend repeat colonoscopy in 7 years.     Best regards,     Mary Grace Lovelace MD

## 2023-10-27 ENCOUNTER — VBI (OUTPATIENT)
Dept: ADMINISTRATIVE | Facility: OTHER | Age: 48
End: 2023-10-27

## 2023-12-15 DIAGNOSIS — N52.02 CORPORO-VENOUS OCCLUSIVE ERECTILE DYSFUNCTION: ICD-10-CM

## 2023-12-18 RX ORDER — TADALAFIL 20 MG/1
TABLET ORAL
Qty: 6 TABLET | Refills: 9 | Status: SHIPPED | OUTPATIENT
Start: 2023-12-18

## 2024-06-07 ENCOUNTER — NURSE TRIAGE (OUTPATIENT)
Age: 49
End: 2024-06-07

## 2024-06-07 NOTE — TELEPHONE ENCOUNTER
----- Message from Leila LANDRUM sent at 6/7/2024 11:00 AM EDT -----  Francie Alvarado, I have a sore nipple with a rash around it. It started 2 days ago. It is really sore around the nipple as well. Nothing happened to that area to cause pain but I figure the soreness with a rash is a bit concerning. Online appointements are for a few weeks from now, I wasn't sure if there was a appointments that are left for people who need to see someone sooner. I might just go into a [facility] walk in tomorrow.

## 2024-06-07 NOTE — TELEPHONE ENCOUNTER
"Patient called in to report soreness of left acerola with red rash around which started 5 days ago. No fever. OV 6/10/24 0720     Reason for Disposition   Localized rash present > 7 days    Answer Assessment - Initial Assessment Questions  1. APPEARANCE of RASH: \"Describe the rash.\"       Red rash -blotchy   2. LOCATION: \"Where is the rash located?\"       Left aerola  3. NUMBER: \"How many spots are there?\"       one  4. SIZE: \"How big are the spots?\" (Inches, centimeters or compare to size of a coin)       1/2 inch 1/2 inch  5. ONSET: \"When did the rash start?\"       5 days ago  6. ITCHING: \"Does the rash itch?\" If Yes, ask: \"How bad is the itch?\"  (Scale 1-10; or mild, moderate, severe)      no  7. PAIN: \"Does the rash hurt?\" If Yes, ask: \"How bad is the pain?\"  (Scale 1-10; or mild, moderate, severe)      Painful at nipple area moderate  8. OTHER SYMPTOMS: \"Do you have any other symptoms?\" (e.g., fever)      no    Protocols used: Rash or Redness - Localized-ADULT-[place]    "

## 2024-06-10 ENCOUNTER — OFFICE VISIT (OUTPATIENT)
Dept: INTERNAL MEDICINE CLINIC | Facility: OTHER | Age: 49
End: 2024-06-10
Payer: COMMERCIAL

## 2024-06-10 VITALS
DIASTOLIC BLOOD PRESSURE: 74 MMHG | HEIGHT: 72 IN | SYSTOLIC BLOOD PRESSURE: 158 MMHG | HEART RATE: 78 BPM | BODY MASS INDEX: 29.36 KG/M2 | OXYGEN SATURATION: 96 % | WEIGHT: 216.8 LBS | TEMPERATURE: 98.2 F

## 2024-06-10 DIAGNOSIS — N64.4 NIPPLE PAIN: Primary | ICD-10-CM

## 2024-06-10 DIAGNOSIS — I10 ESSENTIAL HYPERTENSION: ICD-10-CM

## 2024-06-10 PROBLEM — U07.1 COVID-19: Status: RESOLVED | Noted: 2021-04-14 | Resolved: 2024-06-10

## 2024-06-10 PROCEDURE — 99213 OFFICE O/P EST LOW 20 MIN: CPT

## 2024-06-10 RX ORDER — LISINOPRIL AND HYDROCHLOROTHIAZIDE 12.5; 1 MG/1; MG/1
1 TABLET ORAL DAILY
Qty: 90 TABLET | Refills: 1 | Status: SHIPPED | OUTPATIENT
Start: 2024-06-10

## 2024-06-10 NOTE — ASSESSMENT & PLAN NOTE
BP elevated today.  Patient states that he has been out of his lisinopril/HCTZ  BP med refill sent today.  Patient advised to monitor blood pressures daily.  Limit salt intake, advised healthy diet and exercise  Advised to keep BP log daily and bring to follow-up appointment with PCP in 1 month

## 2024-06-10 NOTE — ASSESSMENT & PLAN NOTE
Suspect possible shingles outbreak as patient had rash associated with pain  Rash has resolved today, no tenderness to palpation with exam  Breast exam within normal limits  Patient advised to monitor his symptoms, if they worsen or return, he is advised to call the office

## 2024-06-10 NOTE — PROGRESS NOTES
Assessment/Plan:    1. Nipple pain  Assessment & Plan:  Suspect possible shingles outbreak as patient had rash associated with pain  Rash has resolved today, no tenderness to palpation with exam  Breast exam within normal limits  Patient advised to monitor his symptoms, if they worsen or return, he is advised to call the office  2. Essential hypertension  Assessment & Plan:  BP elevated today.  Patient states that he has been out of his lisinopril/HCTZ  BP med refill sent today.  Patient advised to monitor blood pressures daily.  Limit salt intake, advised healthy diet and exercise  Advised to keep BP log daily and bring to follow-up appointment with PCP in 1 month  Orders:  -     lisinopril-hydrochlorothiazide (PRINZIDE,ZESTORETIC) 10-12.5 MG per tablet; Take 1 tablet by mouth daily         Depression Screening and Follow-up Plan: Patient was screened for depression during today's encounter. They screened negative with a PHQ-2 score of 0.        M*TVTY software was used to dictate this note.  It may contain errors with dictating incorrect words or incorrect spelling. Please contact the provider directly with any questions.    Subjective:      Patient ID: Jareth Wild is a 48 y.o. male.    Patient is a 48 year old male presenting with left breast soreness x 1 week  Rash present as well at onset, has since resolved yesterday  Rash was painful, denies discharge or blistering   Notes the rash was only present over left areola   Patient does endorse pain is improving, now only TTP  Denies nipple discharge, deviation, mass to breast, skin changes    + chickenpox hx               The following portions of the patient's history were reviewed and updated as appropriate: allergies, current medications, past family history, past medical history, past social history, past surgical history, and problem list.    Review of Systems   Constitutional:  Negative for chills, fatigue and fever.   Respiratory:  Negative for cough,  chest tightness, shortness of breath and wheezing.    Cardiovascular:  Negative for chest pain and palpitations.   Gastrointestinal:  Negative for abdominal pain, nausea and vomiting.   Skin:  Positive for rash.   Neurological:  Negative for dizziness, light-headedness and headaches.         Past Medical History:   Diagnosis Date    Anxiety     BPH with obstruction/lower urinary tract symptoms 2015    Cervicalgia     Corporo-venous occlusive erectile dysfunction 2017    Epididymitis 2017    Erectile dysfunction 2014    Hypertension     Kidney stone     OAB (overactive bladder)     Positive depression screening 5/7/2019    Sore throat (viral) 9/16/2019    Urge incontinence 2014    Urgency of urination 2017         Current Outpatient Medications:     Ascorbic Acid (VITAMIN C) 1000 MG tablet, Take 1,000 mg by mouth daily, Disp: , Rfl:     B Complex Vitamins (B COMPLEX 1 PO), Take by mouth daily , Disp: , Rfl:     Cholecalciferol (VITAMIN D) 2000 units CAPS, Take 1 capsule by mouth daily, Disp: , Rfl:     Coenzyme Q10 (CO Q 10 PO), Take by mouth in the morning, Disp: , Rfl:     lisinopril-hydrochlorothiazide (PRINZIDE,ZESTORETIC) 10-12.5 MG per tablet, Take 1 tablet by mouth daily, Disp: 90 tablet, Rfl: 1    Omega-3 Fatty Acids (FISH OIL) 1000 MG CPDR, Take by mouth daily , Disp: , Rfl:     Red Yeast Rice Extract (RED YEAST RICE PO), Take 2 tablets by mouth in the morning, Disp: , Rfl:     tadalafil (CIALIS) 20 MG tablet, TAKE 1 TABLET BY MOUTH ONE (1) HOUR PRIOR TO INTERCOURSE ON AN EMPTY STOMACH., Disp: 6 tablet, Rfl: 9    Zinc 100 MG TABS, Take 1 tablet by mouth daily, Disp: , Rfl:     ALPRAZolam (XANAX) 0.5 mg tablet, Take 1 tablet (0.5 mg total) by mouth daily as needed for anxiety (for severe anxiety) (Patient not taking: Reported on 10/4/2023), Disp: 15 tablet, Rfl: 0    No Known Allergies    Social History   Past Surgical History:   Procedure Laterality Date    COLONOSCOPY  10/02/2023    HERNIA REPAIR      AR  ARTHRS KNE SURG W/MENISCECTOMY MED/LAT W/SHVG Left 12/11/2020    Procedure: KNEE ARTHROSCOPIC MENISCECTOMY MEDIAL;  Surgeon: Julio Khun MD;  Location: [facility];  Service: Orthopedics    VASECTOMY       Family History   Problem Relation Age of Onset    Lung cancer Father     Cancer Father     Lung cancer Mother     Cancer Mother     Cancer Maternal Grandmother         unknown    Heart disease Maternal Grandfather     Prostate cancer Maternal Grandfather     Cancer Maternal Grandfather        Objective:  /74 (BP Location: Left arm, Patient Position: Sitting, Cuff Size: Standard)   Pulse 78   Temp 98.2 °F (36.8 °C) (Temporal)   Ht 6' (1.829 m)   Wt 98.3 kg (216 lb 12.8 oz) Comment: with shoes on  SpO2 96% Comment: ra  BMI 29.40 kg/m²      Physical Exam  Vitals and nursing note reviewed.   Constitutional:       General: He is not in acute distress.     Appearance: Normal appearance. He is not ill-appearing.   HENT:      Head: Normocephalic and atraumatic.      Right Ear: External ear normal.      Left Ear: External ear normal.      Nose: Nose normal.      Mouth/Throat:      Mouth: Mucous membranes are moist.      Pharynx: Oropharynx is clear.   Eyes:      General: No scleral icterus.     Conjunctiva/sclera: Conjunctivae normal.   Cardiovascular:      Rate and Rhythm: Normal rate and regular rhythm.      Heart sounds: Normal heart sounds. No murmur heard.     No friction rub. No gallop.   Pulmonary:      Effort: Pulmonary effort is normal. No respiratory distress.      Breath sounds: Normal breath sounds. No wheezing, rhonchi or rales.   Chest:      Chest wall: No mass or tenderness.   Breasts:     Breasts are symmetrical.      Right: Normal. No swelling, bleeding, inverted nipple, mass, nipple discharge, skin change or tenderness.      Left: Normal. No swelling, bleeding, inverted nipple, mass, nipple discharge, skin change or tenderness.      Comments: Breast exam within normal limits  bilaterally.  Nontender to palpation  Lymphadenopathy:      Upper Body:      Right upper body: No axillary or pectoral adenopathy.      Left upper body: No axillary or pectoral adenopathy.   Skin:     General: Skin is warm and dry.      Coloration: Skin is not pale.      Findings: No erythema or rash.   Neurological:      General: No focal deficit present.      Mental Status: He is alert and oriented to person, place, and time. Mental status is at baseline.   Psychiatric:         Mood and Affect: Mood normal.         Behavior: Behavior normal.           Disclaimer: This note was generated with voice recognition software.  Phonetic, grammatical, and spelling errors may be present as a result.  Please contact provider with any concerns or questions

## 2024-07-02 ENCOUNTER — RA CDI HCC (OUTPATIENT)
Dept: OTHER | Facility: HOSPITAL | Age: 49
End: 2024-07-02

## 2024-07-02 PROBLEM — S83.242A ACUTE MEDIAL MENISCUS TEAR OF LEFT KNEE: Status: RESOLVED | Noted: 2020-09-10 | Resolved: 2024-07-02

## 2024-07-10 ENCOUNTER — OFFICE VISIT (OUTPATIENT)
Dept: INTERNAL MEDICINE CLINIC | Facility: OTHER | Age: 49
End: 2024-07-10
Payer: COMMERCIAL

## 2024-07-10 VITALS
HEIGHT: 72 IN | DIASTOLIC BLOOD PRESSURE: 86 MMHG | OXYGEN SATURATION: 99 % | HEART RATE: 71 BPM | WEIGHT: 215 LBS | SYSTOLIC BLOOD PRESSURE: 130 MMHG | TEMPERATURE: 98.3 F | BODY MASS INDEX: 29.12 KG/M2

## 2024-07-10 DIAGNOSIS — Z00.00 ANNUAL PHYSICAL EXAM: ICD-10-CM

## 2024-07-10 DIAGNOSIS — Z13.228 SCREENING FOR METABOLIC DISORDER: Primary | ICD-10-CM

## 2024-07-10 DIAGNOSIS — I10 ESSENTIAL HYPERTENSION: ICD-10-CM

## 2024-07-10 DIAGNOSIS — Z12.5 SCREENING FOR PROSTATE CANCER: ICD-10-CM

## 2024-07-10 DIAGNOSIS — R73.01 ELEVATED FASTING GLUCOSE: ICD-10-CM

## 2024-07-10 DIAGNOSIS — E78.5 HYPERLIPIDEMIA, UNSPECIFIED HYPERLIPIDEMIA TYPE: ICD-10-CM

## 2024-07-10 PROCEDURE — 99396 PREV VISIT EST AGE 40-64: CPT | Performed by: NURSE PRACTITIONER

## 2024-07-10 PROCEDURE — 99213 OFFICE O/P EST LOW 20 MIN: CPT | Performed by: NURSE PRACTITIONER

## 2024-07-10 NOTE — ASSESSMENT & PLAN NOTE
-due for fasting blood work   -Continue with well-balanced diet, encouraged daily exercise  -He is up-to-date with prostate and colon cancer screening  -She is up-to-date with vaccinations  -Encourage monthly testicular examination  -Follow-up in 1 year for annual physical or sooner if needed

## 2024-07-10 NOTE — PROGRESS NOTES
[facility] CARE [place]  Well Adult Male Physical Visit  Patient ID: Jareth Wild    : 1975  Age/Gender: 48 y.o. male     DATE: 7/10/2024      Assessment/Plan:    Essential hypertension  -Blood pressure well-controlled  -Continue on lisinopril-hydrochlorothiazide    Annual physical exam  -due for fasting blood work   -Continue with well-balanced diet, encouraged daily exercise  -He is up-to-date with prostate and colon cancer screening  -She is up-to-date with vaccinations  -Encourage monthly testicular examination  -Follow-up in 1 year for annual physical or sooner if needed       Diagnoses and all orders for this visit:    Screening for metabolic disorder  -     Comprehensive metabolic panel; Future  -     CBC and differential  -     Lipid panel    Hyperlipidemia, unspecified hyperlipidemia type  -     Lipid panel    Elevated fasting glucose  -     Hemoglobin A1C    Screening for prostate cancer  -     PSA, Total Screen; Future    Essential hypertension    Annual physical exam          Subjective:     Jareth Wild is a 48 y.o. male who presents to the office on 7/10/2024 for a health maintenance physical.    Patient presents today for follow-up on hypertension.    HTN-well controlled on  lisinopril-HCTZ , he was restarted on lisinopril hydrochlorothiazide at last office visit as he ran out of refills, he denies side effects with this medication, denies chest pain and shortness of breath    HLD- , Cholesterol 204 ,triglycerdies 209 ASCVD 2.5%, improving     Since he has had lyme he has joint pain, if he takes Aleve it takes the pain away         The following portions of the patient's history were reviewed and updated as appropriate: allergies, current medications, past family history, past medical history, past social history, past surgical history, and problem list.    Pt reports overall health:  good  Last Physical: unknown      Healthy Diet:  well balanced   Routine Exercise:  3-4 days a week  Weight Concerns: denies    Problems with vision: has blurry vision   Last Eye Exam:  2023    Problems with Hearing: denies    Routine Dental Exams: denies    Smoking History:  denies  ETOH Use: socially   Illegal Drug Use:  denies  Caffeine Use: 2 10oz cups of coffee a day    Reproductive Health:    Sexually Active: currently   Last PSA:  2023  Testicular self exam: denies    Depression Screening:  PHQ-2/9 Depression Screening    Little interest or pleasure in doing things: 0 - not at all  Feeling down, depressed, or hopeless: 0 - not at all         Last Colonoscopy:  2023  History of abnormal Colonoscopy: in the past           Last Labs:  2023  Review of Systems   Constitutional:  Negative for activity change, appetite change, chills, diaphoresis and fever.   HENT:  Negative for congestion, ear discharge, ear pain, postnasal drip, rhinorrhea, sinus pressure, sinus pain and sore throat.    Eyes:  Negative for pain, discharge, itching and visual disturbance.   Respiratory:  Negative for cough, chest tightness, shortness of breath and wheezing.    Cardiovascular:  Negative for chest pain, palpitations and leg swelling.   Gastrointestinal:  Negative for abdominal pain, blood in stool, constipation, diarrhea, nausea and vomiting.   Endocrine: Negative for polydipsia, polyphagia and polyuria.   Genitourinary:  Negative for difficulty urinating, dysuria, hematuria and urgency.   Musculoskeletal:  Positive for arthralgias. Negative for back pain and neck pain.   Skin:  Negative for rash and wound.   Neurological:  Negative for dizziness, weakness, numbness and headaches.         Patient Active Problem List   Diagnosis    Male erectile dysfunction    Mixed hyperlipidemia    Other chronic nonalcoholic liver disease    CONNER (generalized anxiety disorder)    Seasonal allergies    Injury of left knee    History of Lyme disease    Essential hypertension     Elevated fasting glucose    Palpitations    Nipple pain    Annual physical exam       Past Medical History:   Diagnosis Date    Anxiety     BPH with obstruction/lower urinary tract symptoms 2015    Cervicalgia     Corporo-venous occlusive erectile dysfunction 2017    Epididymitis 2017    Erectile dysfunction 2014    Hypertension     Kidney stone     OAB (overactive bladder)     Positive depression screening 5/7/2019    Sore throat (viral) 9/16/2019    Urge incontinence 2014    Urgency of urination 2017       Past Surgical History:   Procedure Laterality Date    COLONOSCOPY  10/02/2023    HERNIA REPAIR      CO ARTHRS KNE SURG W/MENISCECTOMY MED/LAT W/SHVG Left 12/11/2020    Procedure: KNEE ARTHROSCOPIC MENISCECTOMY MEDIAL;  Surgeon: Julio Kuhn MD;  Location[facility];  Service: Orthopedics    VASECTOMY           Current Outpatient Medications:     Ascorbic Acid (VITAMIN C) 1000 MG tablet, Take 1,000 mg by mouth daily, Disp: , Rfl:     B Complex Vitamins (B COMPLEX 1 PO), Take by mouth daily , Disp: , Rfl:     Cholecalciferol (VITAMIN D) 2000 units CAPS, Take 1 capsule by mouth daily, Disp: , Rfl:     Coenzyme Q10 (CO Q 10 PO), Take by mouth in the morning, Disp: , Rfl:     lisinopril-hydrochlorothiazide (PRINZIDE,ZESTORETIC) 10-12.5 MG per tablet, Take 1 tablet by mouth daily, Disp: 90 tablet, Rfl: 1    Omega-3 Fatty Acids (FISH OIL) 1000 MG CPDR, Take by mouth daily , Disp: , Rfl:     Red Yeast Rice Extract (RED YEAST RICE PO), Take 2 tablets by mouth in the morning, Disp: , Rfl:     tadalafil (CIALIS) 20 MG tablet, TAKE 1 TABLET BY MOUTH ONE (1) HOUR PRIOR TO INTERCOURSE ON AN EMPTY STOMACH., Disp: 6 tablet, Rfl: 9    Zinc 100 MG TABS, Take 1 tablet by mouth daily, Disp: , Rfl:     No Known Allergies    Social History     Socioeconomic History    Marital status: /Civil Union     Spouse name: None    Number of children: None    Years of education: None    Highest education level: None    Occupational History    None   Tobacco Use    Smoking status: Never    Smokeless tobacco: Never   Vaping Use    Vaping status: Never Used   Substance and Sexual Activity    Alcohol use: Yes     Alcohol/week: 8.0 standard drinks of alcohol     Types: 8 Cans of beer per week     Comment: SOCIALLY    Drug use: Not Currently     Types: Marijuana     Comment: Every other day to every 3 days.     Sexual activity: Yes     Partners: Female   Other Topics Concern    None   Social History Narrative    None     Social Determinants of Health     Financial Resource Strain: Not on file   Food Insecurity: Not on file   Transportation Needs: Not on file   Physical Activity: Not on file   Stress: Not on file   Social Connections: Not on file   Intimate Partner Violence: Not on file   Housing Stability: Not on file       Family History   Problem Relation Age of Onset    Lung cancer Father     Cancer Father     Lung cancer Mother     Cancer Mother     Cancer Maternal Grandmother         unknown    Heart disease Maternal Grandfather     Prostate cancer Maternal Grandfather     Cancer Maternal Grandfather        Immunization History   Administered Date(s) Administered    INFLUENZA 09/29/2009, 10/11/2013, 01/11/2023    Influenza, injectable, quadrivalent, preservative free 0.5 mL 01/11/2023    Tdap 05/15/2012, 11/11/2022        Health Maintenance   Topic Date Due    Pneumococcal Vaccine: Pediatrics (0 to 5 Years) and At-Risk Patients (6 to 64 Years) (1 of 2 - PCV) Never done    Hepatitis A Vaccine (1 of 2 - Risk 2-dose series) Never done    COVID-19 Vaccine (1 - 2023-24 season) Never done    Influenza Vaccine (1) 09/01/2024    Zoster Vaccine (1 of 2) 10/29/2024    Depression Screening  06/10/2025    Annual Physical  07/10/2025    Colorectal Cancer Screening  09/30/2030    DTaP,Tdap,and Td Vaccines (3 - Td or Tdap) 11/11/2032    RSV Vaccine Age 60+ Years (1 - 1-dose 60+ series) 10/29/2034    HIV Screening  Completed    Hepatitis C  Screening  Completed    RSV Vaccine age 0-20 Months  Aged Out    HIB Vaccine  Aged Out    IPV Vaccine  Aged Out    Meningococcal ACWY Vaccine  Aged Out    HPV Vaccine  Aged Out       Objective:  Vitals:    07/10/24 1020   BP: 130/86   BP Location: Left arm   Patient Position: Sitting   Cuff Size: Adult   Pulse: 71   Temp: 98.3 °F (36.8 °C)   TempSrc: Temporal   SpO2: 99%   Weight: 97.5 kg (215 lb)   Height: 6' (1.829 m)     Wt Readings from Last 3 Encounters:   07/10/24 97.5 kg (215 lb)   06/10/24 98.3 kg (216 lb 12.8 oz)   10/04/23 96.2 kg (212 lb)     Body mass index is 29.16 kg/m².  No results found.       Physical Exam  Constitutional:       General: He is not in acute distress.     Appearance: He is well-developed. He is not diaphoretic.   HENT:      Head: Normocephalic and atraumatic.      Right Ear: External ear normal.      Left Ear: External ear normal.      Nose: Nose normal.      Mouth/Throat:      Mouth: Mucous membranes are moist.      Pharynx: No oropharyngeal exudate or posterior oropharyngeal erythema.   Eyes:      General:         Right eye: No discharge.         Left eye: No discharge.      Conjunctiva/sclera: Conjunctivae normal.      Pupils: Pupils are equal, round, and reactive to light.   Neck:      Thyroid: No thyromegaly.   Cardiovascular:      Rate and Rhythm: Normal rate and regular rhythm.      Heart sounds: Normal heart sounds. No murmur heard.     No friction rub. No gallop.   Pulmonary:      Effort: Pulmonary effort is normal. No respiratory distress.      Breath sounds: Normal breath sounds. No stridor. No wheezing or rales.   Abdominal:      General: Bowel sounds are normal. There is no distension.      Palpations: Abdomen is soft.      Tenderness: There is no abdominal tenderness.   Musculoskeletal:      Cervical back: Normal range of motion and neck supple.   Lymphadenopathy:      Cervical: No cervical adenopathy.   Skin:     General: Skin is warm and dry.      Findings: No erythema  or rash.   Neurological:      Mental Status: He is alert and oriented to person, place, and time.   Psychiatric:         Behavior: Behavior normal.         Thought Content: Thought content normal.         Judgment: Judgment normal.             Future Appointments   Date Time Provider Department Center   1/15/2025  7:00 AM LEIA Hearn [facility]       LEIA Hearn  [facility] PRIMARY CARE [place]    Patient Care Team:  LEIA Hearn as PCP - General (Family Medicine)  LEIA Hearn as PCP - PCP-[facility]  Ac Brunham MD

## 2024-07-17 DIAGNOSIS — Z00.6 ENCOUNTER FOR EXAMINATION FOR NORMAL COMPARISON OR CONTROL IN CLINICAL RESEARCH PROGRAM: ICD-10-CM

## 2024-08-20 ENCOUNTER — APPOINTMENT (OUTPATIENT)
Dept: LAB | Age: 49
End: 2024-08-20
Payer: COMMERCIAL

## 2024-08-20 DIAGNOSIS — Z12.5 SCREENING FOR PROSTATE CANCER: ICD-10-CM

## 2024-08-20 DIAGNOSIS — Z00.6 ENCOUNTER FOR EXAMINATION FOR NORMAL COMPARISON OR CONTROL IN CLINICAL RESEARCH PROGRAM: ICD-10-CM

## 2024-08-20 LAB
BASOPHILS # BLD AUTO: 0.03 THOUSANDS/ÂΜL (ref 0–0.1)
BASOPHILS NFR BLD AUTO: 1 % (ref 0–1)
CHOLEST SERPL-MCNC: 214 MG/DL
EOSINOPHIL # BLD AUTO: 0.05 THOUSAND/ÂΜL (ref 0–0.61)
EOSINOPHIL NFR BLD AUTO: 1 % (ref 0–6)
ERYTHROCYTE [DISTWIDTH] IN BLOOD BY AUTOMATED COUNT: 12.1 % (ref 11.6–15.1)
EST. AVERAGE GLUCOSE BLD GHB EST-MCNC: 108 MG/DL
HBA1C MFR BLD: 5.4 %
HCT VFR BLD AUTO: 46.8 % (ref 36.5–49.3)
HDLC SERPL-MCNC: 51 MG/DL
HGB BLD-MCNC: 16.2 G/DL (ref 12–17)
IMM GRANULOCYTES # BLD AUTO: 0.02 THOUSAND/UL (ref 0–0.2)
IMM GRANULOCYTES NFR BLD AUTO: 0 % (ref 0–2)
LDLC SERPL CALC-MCNC: 101 MG/DL (ref 0–100)
LYMPHOCYTES # BLD AUTO: 2.28 THOUSANDS/ÂΜL (ref 0.6–4.47)
LYMPHOCYTES NFR BLD AUTO: 43 % (ref 14–44)
MCH RBC QN AUTO: 31.6 PG (ref 26.8–34.3)
MCHC RBC AUTO-ENTMCNC: 34.6 G/DL (ref 31.4–37.4)
MCV RBC AUTO: 91 FL (ref 82–98)
MONOCYTES # BLD AUTO: 0.65 THOUSAND/ÂΜL (ref 0.17–1.22)
MONOCYTES NFR BLD AUTO: 13 % (ref 4–12)
NEUTROPHILS # BLD AUTO: 2.19 THOUSANDS/ÂΜL (ref 1.85–7.62)
NEUTS SEG NFR BLD AUTO: 42 % (ref 43–75)
NONHDLC SERPL-MCNC: 163 MG/DL
NRBC BLD AUTO-RTO: 0 /100 WBCS
PLATELET # BLD AUTO: 227 THOUSANDS/UL (ref 149–390)
PMV BLD AUTO: 10.5 FL (ref 8.9–12.7)
PSA SERPL-MCNC: 1.61 NG/ML (ref 0–4)
RBC # BLD AUTO: 5.13 MILLION/UL (ref 3.88–5.62)
TRIGL SERPL-MCNC: 310 MG/DL
WBC # BLD AUTO: 5.22 THOUSAND/UL (ref 4.31–10.16)

## 2024-08-20 PROCEDURE — 36415 COLL VENOUS BLD VENIPUNCTURE: CPT

## 2024-08-20 PROCEDURE — G0103 PSA SCREENING: HCPCS

## 2024-08-21 DIAGNOSIS — Z13.228 SCREENING FOR METABOLIC DISORDER: Primary | ICD-10-CM

## 2024-08-21 DIAGNOSIS — E78.5 HYPERLIPIDEMIA, UNSPECIFIED HYPERLIPIDEMIA TYPE: ICD-10-CM

## 2024-09-07 LAB
APOB+LDLR+PCSK9 GENE MUT ANL BLD/T: NOT DETECTED
BRCA1+BRCA2 DEL+DUP + FULL MUT ANL BLD/T: NOT DETECTED
MLH1+MSH2+MSH6+PMS2 GN DEL+DUP+FUL M: NOT DETECTED

## 2024-09-18 ENCOUNTER — OFFICE VISIT (OUTPATIENT)
Dept: INTERNAL MEDICINE CLINIC | Facility: OTHER | Age: 49
End: 2024-09-18
Payer: COMMERCIAL

## 2024-09-18 VITALS
HEIGHT: 72 IN | TEMPERATURE: 98 F | OXYGEN SATURATION: 99 % | SYSTOLIC BLOOD PRESSURE: 124 MMHG | HEART RATE: 85 BPM | WEIGHT: 220 LBS | DIASTOLIC BLOOD PRESSURE: 82 MMHG | BODY MASS INDEX: 29.8 KG/M2

## 2024-09-18 DIAGNOSIS — R19.7 DIARRHEA, UNSPECIFIED TYPE: Primary | ICD-10-CM

## 2024-09-18 PROCEDURE — 99213 OFFICE O/P EST LOW 20 MIN: CPT | Performed by: NURSE PRACTITIONER

## 2024-09-18 NOTE — PROGRESS NOTES
Assessment/Plan:    Diarrhea  Symptoms are likely viral in origin and self limiting.  Do not use antidiarrheals.  Maintain adequate rest.  Reviewed importance of adequate fluid intake (small frequent sips) to prevent dehydration.  Encouraged use of electrolyte containing sports drinks (Gatorade or Powerade) mixed with water and take small frequent sips throughout the day.  Early refeeding can reduce illness duration and improve outcomes.  Encourage small bites, bland diet.  Avoid fatty foods, greasy foods, and dairy which all may worsen symptoms.  Discussed hand hygiene to prevent spread.  Minimize close contact with other individuals while symptomatic.  Gradual advancement of diet from bland foods to regular diet.  Call the office if symptoms worsen or do not improve.  Discussed red flag and ER precautions which need immediate eval and treat.    Recommend stool testing, if negative and symptoms persist referral to GI               Diagnoses and all orders for this visit:    Diarrhea, unspecified type  -     Giardia antigen; Future  -     Clostridium difficile toxin by PCR with EIA; Future  -     Ova and parasite examination; Future  -     Stool Enteric Bacterial Panel by PCR; Future          Subjective:      Patient ID: Jareth Wild is a 48 y.o. male.    Patient presents today with diarrhea.  Reports diarrhea right after he eats.   Patient reports previous colonoscopy showed diverticulosis    He reports that this is chronic, however over the past week it is consistent     Diarrhea   This is a new problem. The current episode started in the past 7 days. The problem occurs 5 to 10 times per day. The stool consistency is described as Watery. The patient states that diarrhea does not awaken him from sleep. Associated symptoms include bloating. Pertinent negatives include no abdominal pain, arthralgias, chills, coughing, fever, headaches or vomiting. Risk factors: denies changes to diet or medication changes. He  has tried anti-motility drug and increased fluids for the symptoms. The treatment provided no relief.       The following portions of the patient's history were reviewed and updated as appropriate: allergies, current medications, past family history, past medical history, past social history, past surgical history, and problem list.    Review of Systems   Constitutional:  Negative for activity change, appetite change, chills, diaphoresis and fever.   HENT:  Negative for congestion, ear discharge, ear pain, postnasal drip, rhinorrhea, sinus pressure, sinus pain and sore throat.    Eyes:  Negative for pain, discharge, itching and visual disturbance.   Respiratory:  Negative for cough, chest tightness, shortness of breath and wheezing.    Cardiovascular:  Negative for chest pain, palpitations and leg swelling.   Gastrointestinal:  Positive for bloating and diarrhea. Negative for abdominal pain, blood in stool, constipation, nausea and vomiting.   Endocrine: Negative for polydipsia, polyphagia and polyuria.   Genitourinary:  Negative for difficulty urinating, dysuria, hematuria and urgency.   Musculoskeletal:  Negative for arthralgias, back pain and neck pain.   Skin:  Negative for rash and wound.   Neurological:  Negative for dizziness, weakness, numbness and headaches.         Past Medical History:   Diagnosis Date    Anxiety     BPH with obstruction/lower urinary tract symptoms 2015    Cervicalgia     Corporo-venous occlusive erectile dysfunction 2017    Epididymitis 2017    Erectile dysfunction 2014    Hypertension     Kidney stone     OAB (overactive bladder)     Positive depression screening 5/7/2019    Sore throat (viral) 9/16/2019    Urge incontinence 2014    Urgency of urination 2017         Current Outpatient Medications:     Ascorbic Acid (VITAMIN C) 1000 MG tablet, Take 1,000 mg by mouth daily, Disp: , Rfl:     B Complex Vitamins (B COMPLEX 1 PO), Take by mouth daily , Disp: , Rfl:     Cholecalciferol  (VITAMIN D) 2000 units CAPS, Take 1 capsule by mouth daily, Disp: , Rfl:     Coenzyme Q10 (CO Q 10 PO), Take by mouth in the morning, Disp: , Rfl:     lisinopril-hydrochlorothiazide (PRINZIDE,ZESTORETIC) 10-12.5 MG per tablet, Take 1 tablet by mouth daily, Disp: 90 tablet, Rfl: 1    Omega-3 Fatty Acids (FISH OIL) 1000 MG CPDR, Take by mouth daily , Disp: , Rfl:     Red Yeast Rice Extract (RED YEAST RICE PO), Take 2 tablets by mouth in the morning, Disp: , Rfl:     tadalafil (CIALIS) 20 MG tablet, TAKE 1 TABLET BY MOUTH ONE (1) HOUR PRIOR TO INTERCOURSE ON AN EMPTY STOMACH., Disp: 6 tablet, Rfl: 9    Zinc 100 MG TABS, Take 1 tablet by mouth daily, Disp: , Rfl:     No Known Allergies    Social History   Past Surgical History:   Procedure Laterality Date    COLONOSCOPY  10/02/2023    HERNIA REPAIR      [facility] ARTHRS KNE SURG W/MENISCECTOMY MED/LAT W/SHVG Left 12/11/2020    Procedure: KNEE ARTHROSCOPIC MENISCECTOMY MEDIAL;  Surgeon: Julio Kuhn MD;  Location: [facility];  Service: Orthopedics    VASECTOMY       Family History   Problem Relation Age of Onset    Lung cancer Father     Cancer Father     Lung cancer Mother     Cancer Mother     Cancer Maternal Grandmother         unknown    Heart disease Maternal Grandfather     Prostate cancer Maternal Grandfather     Cancer Maternal Grandfather        Objective:  /82 (BP Location: Left arm, Patient Position: Sitting, Cuff Size: Adult)   Pulse 85   Temp 98 °F (36.7 °C) (Temporal)   Ht 6' (1.829 m)   Wt 99.8 kg (220 lb)   SpO2 99%   BMI 29.84 kg/m²              Physical Exam  Constitutional:       General: He is not in acute distress.     Appearance: He is well-developed. He is not diaphoretic.   HENT:      Head: Normocephalic and atraumatic.      Right Ear: External ear normal.      Left Ear: External ear normal.      Nose: Nose normal.      Mouth/Throat:      Mouth: Mucous membranes are moist.      Pharynx: No oropharyngeal exudate or posterior  oropharyngeal erythema.   Eyes:      General:         Right eye: No discharge.         Left eye: No discharge.      Conjunctiva/sclera: Conjunctivae normal.      Pupils: Pupils are equal, round, and reactive to light.   Neck:      Thyroid: No thyromegaly.   Cardiovascular:      Rate and Rhythm: Normal rate and regular rhythm.      Heart sounds: Normal heart sounds. No murmur heard.     No friction rub. No gallop.   Pulmonary:      Effort: Pulmonary effort is normal. No respiratory distress.      Breath sounds: Normal breath sounds. No stridor. No wheezing or rales.   Abdominal:      General: Bowel sounds are increased. There is no distension.      Palpations: Abdomen is soft.      Tenderness: There is no abdominal tenderness.   Musculoskeletal:      Cervical back: Normal range of motion and neck supple.   Lymphadenopathy:      Cervical: No cervical adenopathy.   Skin:     General: Skin is warm and dry.      Findings: No erythema or rash.   Neurological:      Mental Status: He is alert and oriented to person, place, and time.   Psychiatric:         Behavior: Behavior normal.         Thought Content: Thought content normal.         Judgment: Judgment normal.

## 2024-09-18 NOTE — ASSESSMENT & PLAN NOTE
Symptoms are likely viral in origin and self limiting.  Do not use antidiarrheals.  Maintain adequate rest.  Reviewed importance of adequate fluid intake (small frequent sips) to prevent dehydration.  Encouraged use of electrolyte containing sports drinks (Gatorade or Powerade) mixed with water and take small frequent sips throughout the day.  Early refeeding can reduce illness duration and improve outcomes.  Encourage small bites, bland diet.  Avoid fatty foods, greasy foods, and dairy which all may worsen symptoms.  Discussed hand hygiene to prevent spread.  Minimize close contact with other individuals while symptomatic.  Gradual advancement of diet from bland foods to regular diet.  Call the office if symptoms worsen or do not improve.  Discussed red flag and ER precautions which need immediate eval and treat.    Recommend stool testing, if negative and symptoms persist referral to GI

## 2024-09-19 ENCOUNTER — APPOINTMENT (OUTPATIENT)
Dept: LAB | Facility: IMAGING CENTER | Age: 49
End: 2024-09-19
Payer: COMMERCIAL

## 2024-09-19 DIAGNOSIS — R19.7 DIARRHEA, UNSPECIFIED TYPE: ICD-10-CM

## 2024-09-19 PROCEDURE — 87506 IADNA-DNA/RNA PROBE TQ 6-11: CPT

## 2024-09-19 PROCEDURE — 87177 OVA AND PARASITES SMEARS: CPT

## 2024-09-19 PROCEDURE — 87209 SMEAR COMPLEX STAIN: CPT

## 2024-09-20 LAB
C COLI+JEJUNI TUF STL QL NAA+PROBE: NEGATIVE
C DIFF TOX GENS STL QL NAA+PROBE: NEGATIVE
EC STX1+STX2 GENES STL QL NAA+PROBE: NEGATIVE
SALMONELLA SP SPAO STL QL NAA+PROBE: NEGATIVE
SHIGELLA SP+EIEC IPAH STL QL NAA+PROBE: NEGATIVE

## 2024-09-21 LAB — G LAMBLIA AG STL QL IA: NEGATIVE

## 2024-10-09 ENCOUNTER — TELEPHONE (OUTPATIENT)
Dept: UROLOGY | Facility: MEDICAL CENTER | Age: 49
End: 2024-10-09

## 2024-10-09 NOTE — TELEPHONE ENCOUNTER
Spoke to patient requesting he get his fasting CMP done prior to 10/22 appt with Dr. Burnham.  Active order in chart.  Patient said he will go this weekend.

## 2024-10-14 ENCOUNTER — APPOINTMENT (OUTPATIENT)
Dept: LAB | Facility: IMAGING CENTER | Age: 49
End: 2024-10-14
Payer: COMMERCIAL

## 2024-10-14 DIAGNOSIS — R97.20 ELEVATED PSA: Primary | ICD-10-CM

## 2024-10-14 DIAGNOSIS — N40.0 BPH WITHOUT OBSTRUCTION/LOWER URINARY TRACT SYMPTOMS: ICD-10-CM

## 2024-10-14 DIAGNOSIS — Z13.228 SCREENING FOR METABOLIC DISORDER: ICD-10-CM

## 2024-10-14 DIAGNOSIS — R97.20 ELEVATED PSA: ICD-10-CM

## 2024-10-14 LAB
ALBUMIN SERPL BCG-MCNC: 4.3 G/DL (ref 3.5–5)
ALP SERPL-CCNC: 47 U/L (ref 34–104)
ALT SERPL W P-5'-P-CCNC: 49 U/L (ref 7–52)
ANION GAP SERPL CALCULATED.3IONS-SCNC: 6 MMOL/L (ref 4–13)
AST SERPL W P-5'-P-CCNC: 39 U/L (ref 13–39)
BASOPHILS # BLD AUTO: 0.03 THOUSANDS/ΜL (ref 0–0.1)
BASOPHILS NFR BLD AUTO: 1 % (ref 0–1)
BILIRUB SERPL-MCNC: 0.61 MG/DL (ref 0.2–1)
BUN SERPL-MCNC: 14 MG/DL (ref 5–25)
CALCIUM SERPL-MCNC: 9.3 MG/DL (ref 8.4–10.2)
CHLORIDE SERPL-SCNC: 105 MMOL/L (ref 96–108)
CHOLEST SERPL-MCNC: 214 MG/DL
CO2 SERPL-SCNC: 28 MMOL/L (ref 21–32)
CREAT SERPL-MCNC: 0.91 MG/DL (ref 0.6–1.3)
EOSINOPHIL # BLD AUTO: 0.1 THOUSAND/ΜL (ref 0–0.61)
EOSINOPHIL NFR BLD AUTO: 2 % (ref 0–6)
ERYTHROCYTE [DISTWIDTH] IN BLOOD BY AUTOMATED COUNT: 12.4 % (ref 11.6–15.1)
GFR SERPL CREATININE-BSD FRML MDRD: 98 ML/MIN/1.73SQ M
GLUCOSE P FAST SERPL-MCNC: 99 MG/DL (ref 65–99)
HCT VFR BLD AUTO: 45.7 % (ref 36.5–49.3)
HDLC SERPL-MCNC: 50 MG/DL
HGB BLD-MCNC: 15.9 G/DL (ref 12–17)
IMM GRANULOCYTES # BLD AUTO: 0.02 THOUSAND/UL (ref 0–0.2)
IMM GRANULOCYTES NFR BLD AUTO: 0 % (ref 0–2)
LDLC SERPL CALC-MCNC: 139 MG/DL (ref 0–100)
LYMPHOCYTES # BLD AUTO: 2.24 THOUSANDS/ΜL (ref 0.6–4.47)
LYMPHOCYTES NFR BLD AUTO: 41 % (ref 14–44)
MCH RBC QN AUTO: 32.3 PG (ref 26.8–34.3)
MCHC RBC AUTO-ENTMCNC: 34.8 G/DL (ref 31.4–37.4)
MCV RBC AUTO: 93 FL (ref 82–98)
MONOCYTES # BLD AUTO: 0.6 THOUSAND/ΜL (ref 0.17–1.22)
MONOCYTES NFR BLD AUTO: 11 % (ref 4–12)
NEUTROPHILS # BLD AUTO: 2.51 THOUSANDS/ΜL (ref 1.85–7.62)
NEUTS SEG NFR BLD AUTO: 45 % (ref 43–75)
NONHDLC SERPL-MCNC: 164 MG/DL
NRBC BLD AUTO-RTO: 0 /100 WBCS
PLATELET # BLD AUTO: 255 THOUSANDS/UL (ref 149–390)
PMV BLD AUTO: 10.5 FL (ref 8.9–12.7)
POTASSIUM SERPL-SCNC: 3.8 MMOL/L (ref 3.5–5.3)
PROT SERPL-MCNC: 6.7 G/DL (ref 6.4–8.4)
PSA SERPL-MCNC: 1.87 NG/ML (ref 0–4)
RBC # BLD AUTO: 4.92 MILLION/UL (ref 3.88–5.62)
SODIUM SERPL-SCNC: 139 MMOL/L (ref 135–147)
TRIGL SERPL-MCNC: 123 MG/DL
WBC # BLD AUTO: 5.5 THOUSAND/UL (ref 4.31–10.16)

## 2024-10-14 PROCEDURE — 36415 COLL VENOUS BLD VENIPUNCTURE: CPT

## 2024-10-14 PROCEDURE — 80053 COMPREHEN METABOLIC PANEL: CPT

## 2024-10-14 PROCEDURE — 84153 ASSAY OF PSA TOTAL: CPT

## 2024-10-22 ENCOUNTER — OFFICE VISIT (OUTPATIENT)
Dept: UROLOGY | Facility: MEDICAL CENTER | Age: 49
End: 2024-10-22
Payer: COMMERCIAL

## 2024-10-22 VITALS
HEIGHT: 72 IN | DIASTOLIC BLOOD PRESSURE: 90 MMHG | SYSTOLIC BLOOD PRESSURE: 132 MMHG | WEIGHT: 219 LBS | HEART RATE: 69 BPM | BODY MASS INDEX: 29.66 KG/M2 | OXYGEN SATURATION: 97 %

## 2024-10-22 DIAGNOSIS — N40.0 BPH WITHOUT OBSTRUCTION/LOWER URINARY TRACT SYMPTOMS: Primary | ICD-10-CM

## 2024-10-22 DIAGNOSIS — Z87.898 HISTORY OF ELEVATED PSA: ICD-10-CM

## 2024-10-22 DIAGNOSIS — Z80.42 FAMILY HISTORY OF PROSTATE CANCER: ICD-10-CM

## 2024-10-22 PROCEDURE — 99214 OFFICE O/P EST MOD 30 MIN: CPT | Performed by: UROLOGY

## 2024-10-22 NOTE — LETTER
2024     LEIA Hearn  [address]  [address]  [facility] [address]    Patient: Jareth Wild   YOB: 1975   Date of Visit: 10/22/2024       Dear Dr. Perez:    Thank you for referring Jareth Wild to me for evaluation. Below are my notes for this consultation.    If you have questions, please do not hesitate to call me. I look forward to following your patient along with you.         Sincerely,        Ac Burnham MD        CC: No Recipients    Ac Burnham MD  10/22/2024 12:04 PM  Sign when Signing Visit  Ambulatory Visit  Name: Jareth Wild      : 1975      MRN: [id number]  Encounter Provider: Ac Burnham MD  Encounter Date: 10/22/2024   Encounter department: [facility] [place]    Assessment & Plan  BPH without obstruction/lower urinary tract symptoms  Voiding well with low AUA symptom score       History of elevated PSA  Now normalized but still 1.7 which is elevated for manage his fifth decade-yearly PSA and GALINA       Family history of prostate cancer  Questionable history in grandfather         History of Present Illness    Jareth Wild is a 48 y.o. male who presents with a history of an elevated PSA for age originally 3.7.  The patient has since come down to 2.5 and now 1.7 with multiparametric MRI a year ago showing no evidence of suspicious lesions being PI-RADS 2.  He has erectile dysfunction Cialis responsive.  He denies any significant voiding problems other than mild frequency    History obtained from : patient  Review of Systems   Genitourinary:  Positive for frequency.   All other systems reviewed and are negative.    Pertinent Medical History                Objective    /90 (BP Location: Left arm, Patient Position: Sitting, Cuff Size: Adult)   Pulse 69   Ht 6' (1.829 m)   Wt 99.3 kg (219 lb)   SpO2 97%   BMI 29.70 kg/m²   Physical Exam  Vitals reviewed.   Constitutional:        General: He is not in acute distress.     Appearance: Normal appearance. He is not ill-appearing, toxic-appearing or diaphoretic.   HENT:      Head: Normocephalic and atraumatic.      Nose: Nose normal.      Mouth/Throat:      Mouth: Mucous membranes are dry.   Eyes:      Extraocular Movements: Extraocular movements intact.   Pulmonary:      Effort: Pulmonary effort is normal. No respiratory distress.   Abdominal:      Palpations: Abdomen is soft.   Genitourinary:     Penis: Normal.       Testes: Normal.      Prostate: Normal.      Rectum: Normal.      Comments: GALINA normal anal verge normal anal sphincter tone no palpable rectal masses.  Prostate approximately 40 g a nodular nontender  Musculoskeletal:         General: Normal range of motion.      Cervical back: Neck supple.   Skin:     General: Skin is dry.   Neurological:      Mental Status: He is alert and oriented to person, place, and time.   Psychiatric:         Mood and Affect: Mood normal.         Behavior: Behavior normal.         Thought Content: Thought content normal.         Judgment: Judgment normal.       Results  Lab Results   Component Value Date    PSA 1.871 10/14/2024    PSA 1.610 08/20/2024    PSA 2.5 07/29/2023     Lab Results   Component Value Date    GLUCOSE 96 11/07/2015    CALCIUM 9.3 10/14/2024     11/07/2015    K 3.8 10/14/2024    CO2 28 10/14/2024     10/14/2024    BUN 14 10/14/2024    CREATININE 0.91 10/14/2024     Lab Results   Component Value Date    WBC 5.50 10/14/2024    HGB 15.9 10/14/2024    HCT 45.7 10/14/2024    MCV 93 10/14/2024     10/14/2024       Office Urine Dip  No results found for this or any previous visit (from the past 1 hour(s)).]    Administrative Statements

## 2024-10-22 NOTE — PROGRESS NOTES
Ambulatory Visit  Name: Jareth Wild      : 1975      MRN: [id number]  Encounter Provider: Ac Burnham MD  Encounter Date: 10/22/2024   Encounter department: [facility] [place]    Assessment & Plan  BPH without obstruction/lower urinary tract symptoms  Voiding well with low AUA symptom score       History of elevated PSA  Now normalized but still 1.7 which is elevated for manage his fifth decade-yearly PSA and GALINA       Family history of prostate cancer  Questionable history in grandfather         History of Present Illness     Jareth Wild is a 48 y.o. male who presents with a history of an elevated PSA for age originally 3.7.  The patient has since come down to 2.5 and now 1.7 with multiparametric MRI a year ago showing no evidence of suspicious lesions being PI-RADS 2.  He has erectile dysfunction Cialis responsive.  He denies any significant voiding problems other than mild frequency    History obtained from : patient  Review of Systems   Genitourinary:  Positive for frequency.   All other systems reviewed and are negative.    Pertinent Medical History                 Objective     /90 (BP Location: Left arm, Patient Position: Sitting, Cuff Size: Adult)   Pulse 69   Ht 6' (1.829 m)   Wt 99.3 kg (219 lb)   SpO2 97%   BMI 29.70 kg/m²   Physical Exam  Vitals reviewed.   Constitutional:       General: He is not in acute distress.     Appearance: Normal appearance. He is not ill-appearing, toxic-appearing or diaphoretic.   HENT:      Head: Normocephalic and atraumatic.      Nose: Nose normal.      Mouth/Throat:      Mouth: Mucous membranes are dry.   Eyes:      Extraocular Movements: Extraocular movements intact.   Pulmonary:      Effort: Pulmonary effort is normal. No respiratory distress.   Abdominal:      Palpations: Abdomen is soft.   Genitourinary:     Penis: Normal.       Testes: Normal.      Prostate: Normal.      Rectum: Normal.      Comments: GALINA normal  anal verge normal anal sphincter tone no palpable rectal masses.  Prostate approximately 40 g a nodular nontender  Musculoskeletal:         General: Normal range of motion.      Cervical back: Neck supple.   Skin:     General: Skin is dry.   Neurological:      Mental Status: He is alert and oriented to person, place, and time.   Psychiatric:         Mood and Affect: Mood normal.         Behavior: Behavior normal.         Thought Content: Thought content normal.         Judgment: Judgment normal.       Results  Lab Results   Component Value Date    PSA 1.871 10/14/2024    PSA 1.610 08/20/2024    PSA 2.5 07/29/2023     Lab Results   Component Value Date    GLUCOSE 96 11/07/2015    CALCIUM 9.3 10/14/2024     11/07/2015    K 3.8 10/14/2024    CO2 28 10/14/2024     10/14/2024    BUN 14 10/14/2024    CREATININE 0.91 10/14/2024     Lab Results   Component Value Date    WBC 5.50 10/14/2024    HGB 15.9 10/14/2024    HCT 45.7 10/14/2024    MCV 93 10/14/2024     10/14/2024       Office Urine Dip  No results found for this or any previous visit (from the past 1 hour(s)).]    Administrative Statements

## 2025-01-15 ENCOUNTER — OFFICE VISIT (OUTPATIENT)
Dept: INTERNAL MEDICINE CLINIC | Facility: OTHER | Age: 50
End: 2025-01-15
Payer: COMMERCIAL

## 2025-01-15 VITALS
HEART RATE: 118 BPM | DIASTOLIC BLOOD PRESSURE: 80 MMHG | SYSTOLIC BLOOD PRESSURE: 126 MMHG | BODY MASS INDEX: 30.34 KG/M2 | TEMPERATURE: 98.1 F | WEIGHT: 224 LBS | HEIGHT: 72 IN | OXYGEN SATURATION: 97 %

## 2025-01-15 DIAGNOSIS — E78.2 MIXED HYPERLIPIDEMIA: ICD-10-CM

## 2025-01-15 DIAGNOSIS — Z13.228 SCREENING FOR METABOLIC DISORDER: Primary | ICD-10-CM

## 2025-01-15 DIAGNOSIS — J11.1 FLU: ICD-10-CM

## 2025-01-15 DIAGNOSIS — N40.0 BPH WITHOUT OBSTRUCTION/LOWER URINARY TRACT SYMPTOMS: ICD-10-CM

## 2025-01-15 DIAGNOSIS — E78.5 HYPERLIPIDEMIA, UNSPECIFIED HYPERLIPIDEMIA TYPE: ICD-10-CM

## 2025-01-15 DIAGNOSIS — R73.01 ELEVATED FASTING GLUCOSE: ICD-10-CM

## 2025-01-15 DIAGNOSIS — I10 ESSENTIAL HYPERTENSION: ICD-10-CM

## 2025-01-15 PROBLEM — S89.92XA INJURY OF LEFT KNEE: Status: RESOLVED | Noted: 2020-08-05 | Resolved: 2025-01-15

## 2025-01-15 PROBLEM — N64.4 NIPPLE PAIN: Status: RESOLVED | Noted: 2024-06-10 | Resolved: 2025-01-15

## 2025-01-15 PROBLEM — R00.2 PALPITATIONS: Status: RESOLVED | Noted: 2023-01-11 | Resolved: 2025-01-15

## 2025-01-15 LAB
SARS-COV-2 AG UPPER RESP QL IA: NEGATIVE
SL AMB POCT RAPID FLU A: POSITIVE
SL AMB POCT RAPID FLU B: NEGATIVE
VALID CONTROL: NORMAL

## 2025-01-15 PROCEDURE — 87811 SARS-COV-2 COVID19 W/OPTIC: CPT | Performed by: NURSE PRACTITIONER

## 2025-01-15 PROCEDURE — 87804 INFLUENZA ASSAY W/OPTIC: CPT | Performed by: NURSE PRACTITIONER

## 2025-01-15 PROCEDURE — 99214 OFFICE O/P EST MOD 30 MIN: CPT | Performed by: NURSE PRACTITIONER

## 2025-01-15 RX ORDER — LISINOPRIL AND HYDROCHLOROTHIAZIDE 10; 12.5 MG/1; MG/1
1 TABLET ORAL DAILY
Qty: 90 TABLET | Refills: 1 | Status: SHIPPED | OUTPATIENT
Start: 2025-01-15

## 2025-01-15 NOTE — ASSESSMENT & PLAN NOTE
-Blood pressure well-controlled  -Continue on lisinopril-hydrochlorothiazide    Orders:    lisinopril-hydrochlorothiazide (PRINZIDE,ZESTORETIC) 10-12.5 MG per tablet; Take 1 tablet by mouth daily

## 2025-01-15 NOTE — PROGRESS NOTES
Name: Jareth Wild      : 1975      MRN: [id number]  Encounter Provider: LEIA Hearn  Encounter Date: 1/15/2025   Encounter department: [facility]  :  Assessment & Plan  Screening for metabolic disorder    Orders:    Comprehensive metabolic panel; Future    CBC and differential    Lipid panel    Hyperlipidemia, unspecified hyperlipidemia type    Orders:    Lipid panel    Essential hypertension  -Blood pressure well-controlled  -Continue on lisinopril-hydrochlorothiazide    Orders:    lisinopril-hydrochlorothiazide (PRINZIDE,ZESTORETIC) 10-12.5 MG per tablet; Take 1 tablet by mouth daily    Flu  Illness appears to be viral in nature.   Rest and fluids advised.   Educated that the course of this illness could be 2-4 weeks.   Discussed symptomatic relief, such as warm steam inhalations, tylenol/ibuprofen for fevers and body aches, rest, and drink plenty of fluids.  Warm salt gargles for sore throat.   Discussed red flag signs to go to the ER, such as chest pain or shortness of breath.   Return to the office for reevaluation if symptoms worsen or do not improve in 1-2 weeks       Orders:    POCT Rapid Covid Ag    POCT rapid flu A and B    BPH without obstruction/lower urinary tract symptoms  -Continue follow with urology         Mixed hyperlipidemia  Continue red yeast rice and fish oil, ASCVD risk of 4.3%  Recommend healthy lifestyle choices for your cholesterol.  Low fat/low cholesterol diet.  Limit/avoid red meat.  Eat more lean meat - chicken breast, ground turkey, fish.  Exercise 30 mins at least 5 times a week as tolerated.             Elevated fasting glucose  Patient is to continue to work on diet and exercise.  Limit sugars and carbohydrate intake.    Avoid soda, juice, sweets, cookies, desserts, pasta, bread.   Eat more whole grains, exercised 30 min of cardio at least 3 times a week.                  BMI Counseling: Body mass index is 30.38  kg/m². The BMI is above normal. Nutrition recommendations include reducing portion sizes, decreasing overall calorie intake, 3-5 servings of fruits/vegetables daily, and reducing fast food intake. Exercise recommendations include moderate aerobic physical activity for 150 minutes/week.    History of Present Illness     Patient presents today for follow-up and to review blood work.    CBC, CMP and PSA unremarkable    HTN-well controlled on  lisinopril-HCTZ , he was restarted on lisinopril hydrochlorothiazide at last office visit as he ran out of refills, he denies side effects with this medication, denies chest pain and shortness of breath    HLD-patient continues on red yeast rice and fish oil, cholesterol 214 triglycerides 123, HDL 50, , ASCVD 4.3%, improving     Patient reports that he was traveling for work, prior to leaving for his work about a week ago he was feeling sick then got better on Sunday of this week he started with sinus congestion, chills, fever, sore throat, body aches, headache which has been getting worse          Review of Systems   Constitutional:  Positive for chills and fever. Negative for activity change, appetite change and diaphoresis.   HENT:  Positive for congestion. Negative for ear discharge, ear pain, postnasal drip, rhinorrhea, sinus pressure, sinus pain and sore throat.    Eyes:  Negative for pain, discharge, itching and visual disturbance.   Respiratory:  Negative for cough, chest tightness, shortness of breath and wheezing.    Cardiovascular:  Negative for chest pain, palpitations and leg swelling.   Gastrointestinal:  Negative for abdominal pain, constipation, diarrhea, nausea and vomiting.   Endocrine: Negative for polydipsia, polyphagia and polyuria.   Genitourinary:  Negative for difficulty urinating, dysuria and urgency.   Musculoskeletal:  Negative for arthralgias, back pain and neck pain.   Skin:  Negative for rash and wound.   Neurological:  Positive for headaches.  Negative for dizziness, weakness and numbness.       Objective   /80 (BP Location: Left arm, Patient Position: Sitting, Cuff Size: Large)   Pulse (!) 118   Temp 98.1 °F (36.7 °C)   Ht 6' (1.829 m)   Wt 102 kg (224 lb)   SpO2 97%   BMI 30.38 kg/m²      Physical Exam  Constitutional:       General: He is not in acute distress.     Appearance: He is well-developed. He is ill-appearing. He is not diaphoretic.   HENT:      Head: Normocephalic and atraumatic.      Right Ear: External ear normal.      Left Ear: External ear normal.      Nose: Nose normal.      Mouth/Throat:      Mouth: Mucous membranes are moist.      Pharynx: No oropharyngeal exudate or posterior oropharyngeal erythema.   Eyes:      General:         Right eye: No discharge.         Left eye: No discharge.      Conjunctiva/sclera: Conjunctivae normal.      Pupils: Pupils are equal, round, and reactive to light.   Neck:      Thyroid: No thyromegaly.   Cardiovascular:      Rate and Rhythm: Regular rhythm. Tachycardia present.      Heart sounds: Normal heart sounds. No murmur heard.     No friction rub. No gallop.   Pulmonary:      Effort: Pulmonary effort is normal. No respiratory distress.      Breath sounds: Normal breath sounds. No stridor. No wheezing or rales.   Abdominal:      General: Bowel sounds are normal. There is no distension.      Palpations: Abdomen is soft.      Tenderness: There is no abdominal tenderness.   Musculoskeletal:      Cervical back: Normal range of motion and neck supple.   Lymphadenopathy:      Cervical: No cervical adenopathy.   Skin:     General: Skin is warm and dry.      Findings: No erythema or rash.   Neurological:      Mental Status: He is alert and oriented to person, place, and time.   Psychiatric:         Behavior: Behavior normal.         Thought Content: Thought content normal.         Judgment: Judgment normal.

## 2025-01-15 NOTE — ASSESSMENT & PLAN NOTE
Illness appears to be viral in nature.   Rest and fluids advised.   Educated that the course of this illness could be 2-4 weeks.   Discussed symptomatic relief, such as warm steam inhalations, tylenol/ibuprofen for fevers and body aches, rest, and drink plenty of fluids.  Warm salt gargles for sore throat.   Discussed red flag signs to go to the ER, such as chest pain or shortness of breath.   Return to the office for reevaluation if symptoms worsen or do not improve in 1-2 weeks       Orders:    POCT Rapid Covid Ag    POCT rapid flu A and B

## 2025-01-15 NOTE — ASSESSMENT & PLAN NOTE
Continue red yeast rice and fish oil, ASCVD risk of 4.3%  Recommend healthy lifestyle choices for your cholesterol.  Low fat/low cholesterol diet.  Limit/avoid red meat.  Eat more lean meat - chicken breast, ground turkey, fish.  Exercise 30 mins at least 5 times a week as tolerated.

## 2025-01-29 ENCOUNTER — VBI (OUTPATIENT)
Dept: ADMINISTRATIVE | Facility: OTHER | Age: 50
End: 2025-01-29

## 2025-01-29 NOTE — TELEPHONE ENCOUNTER
01/29/25 2:00 PM     Chart reviewed for Blood Pressure was/were not submitted to the patient's insurance.     Anna Burgos MA   [facility]

## 2025-02-06 DIAGNOSIS — N52.02 CORPORO-VENOUS OCCLUSIVE ERECTILE DYSFUNCTION: ICD-10-CM

## 2025-02-06 RX ORDER — TADALAFIL 20 MG/1
TABLET ORAL
Qty: 6 TABLET | Refills: 2 | Status: SHIPPED | OUTPATIENT
Start: 2025-02-06

## 2025-02-14 PROBLEM — J11.1 FLU: Status: RESOLVED | Noted: 2025-01-15 | Resolved: 2025-02-14

## 2025-04-29 DIAGNOSIS — N52.02 CORPORO-VENOUS OCCLUSIVE ERECTILE DYSFUNCTION: ICD-10-CM

## 2025-04-30 RX ORDER — TADALAFIL 20 MG/1
TABLET ORAL
Qty: 6 TABLET | Refills: 9 | Status: SHIPPED | OUTPATIENT
Start: 2025-04-30

## 2025-05-16 NOTE — PROGRESS NOTES
Assessment/Plan:    Essential hypertension  Will start lisinopril-hydrochlorothiazide  Follow up in one month of recheck  Continue current regimen -   Continue to monitor blood pressure at home  Goal BP is < 130/80  Contact our office for consistent elevations  Recommend low sodium diet  Exercise 30 minutes 5 times a week as tolerated  Recommend yearly eye exam        Mixed hyperlipidemia  Recommend healthy lifestyle choices for your cholesterol  Low fat/low cholesterol diet  Limit/avoid red meat  Eat more lean meat - chicken breast, ground turkey, fish  Exercise 30 mins at least 5 times a week as tolerated  BMI Counseling: Body mass index is 28 9 kg/m²  The BMI is above normal  Nutrition recommendations include decreasing portion sizes, encouraging healthy choices of fruits and vegetables, decreasing fast food intake, consuming healthier snacks, limiting drinks that contain sugar, moderation in carbohydrate intake, increasing intake of lean protein, reducing intake of saturated and trans fat and reducing intake of cholesterol  Exercise recommendations include moderate physical activity 150 minutes/week and exercising 3-5 times per week  Rationale for BMI follow-up plan is due to patient being overweight or obese  Depression Screening and Follow-up Plan: Patient was screened for depression during today's encounter  They screened negative with a PHQ-2 score of 0  Diagnoses and all orders for this visit:    Encounter for colorectal cancer screening  -     Ambulatory referral for colonoscopy; Future  -     Cologuard    Essential hypertension  -     CBC and differential  -     Comprehensive metabolic panel; Future  -     Lipid panel  -     lisinopril-hydrochlorothiazide (PRINZIDE,ZESTORETIC) 10-12 5 MG per tablet; Take 1 tablet by mouth daily    Encounter for hepatitis C screening test for low risk patient  -     Hepatitis C antibody;  Future    Screening for HIV (human immunodeficiency Medication pended . Routed for processing     virus)  -     HIV 1/2 Antigen/Antibody (4th Generation) w Reflex [facility]; Future    Mixed hyperlipidemia          Subjective:      Patient ID: Addie Rosales is a 49 y o  male  Patient presents today for a year follow up  Due for updated blood work  HTN- he has been noting that his blood pressure is elevated at home, he used to be on lisinopril for hypertension when he was younger, 1 reading at home was 159/100, he did report blurry vision at that time, denies chest pain and shortness of breath    HLD- need updated lipid panel     Recently had some weight gain  Has been traveling a lot for work  He will get the influenza vaccination when he returns from his most recent work trip, and he will need Tdap today  The following portions of the patient's history were reviewed and updated as appropriate: allergies, current medications, past family history, past medical history, past social history, past surgical history and problem list     Review of Systems   Constitutional: Negative for activity change, appetite change, chills, diaphoresis and fever  HENT: Negative for congestion, ear discharge, ear pain, postnasal drip, rhinorrhea, sinus pressure, sinus pain and sore throat  Eyes: Negative for pain, discharge, itching and visual disturbance  Respiratory: Negative for cough, chest tightness, shortness of breath and wheezing  Cardiovascular: Negative for chest pain, palpitations and leg swelling  Gastrointestinal: Negative for abdominal pain, constipation, diarrhea, nausea and vomiting  Endocrine: Negative for polydipsia, polyphagia and polyuria  Genitourinary: Negative for difficulty urinating, dysuria and urgency  Musculoskeletal: Negative for arthralgias, back pain and neck pain  Skin: Negative for rash and wound  Neurological: Negative for dizziness, weakness, numbness and headaches           Past Medical History:   Diagnosis Date   • Anxiety    • BPH with obstruction/lower urinary tract symptoms 2015   • Cervicalgia    • Corporo-venous occlusive erectile dysfunction 2017   • Epididymitis 2017   • Erectile dysfunction 2014   • Hypertension    • Kidney stone    • OAB (overactive bladder)    • Positive depression screening 5/7/2019   • Sore throat (viral) 9/16/2019   • Urge incontinence 2014   • Urgency of urination 2017         Current Outpatient Medications:   •  ALPRAZolam (XANAX) 0 5 mg tablet, Take 1 tablet (0 5 mg total) by mouth daily as needed for anxiety (for severe anxiety), Disp: 15 tablet, Rfl: 0  •  Ascorbic Acid (VITAMIN C) 1000 MG tablet, Take 1,000 mg by mouth daily, Disp: , Rfl:   •  B Complex Vitamins (B COMPLEX 1 PO), Take by mouth daily , Disp: , Rfl:   •  Cholecalciferol (VITAMIN D) 2000 units CAPS, Take 1 capsule by mouth daily, Disp: , Rfl:   •  lisinopril-hydrochlorothiazide (PRINZIDE,ZESTORETIC) 10-12 5 MG per tablet, Take 1 tablet by mouth daily, Disp: 90 tablet, Rfl: 0  •  Omega-3 Fatty Acids (FISH OIL) 1000 MG CPDR, Take by mouth daily , Disp: , Rfl:   •  tadalafil (CIALIS) 20 MG tablet, Take 1 tablet by mouth one (1) hour prior to intercourse on an empty stomach , Disp: 10 tablet, Rfl: 0  •  Zinc 100 MG TABS, Take 1 tablet by mouth daily, Disp: , Rfl:     No Known Allergies    Social History   Past Surgical History:   Procedure Laterality Date   • HERNIA REPAIR     • CA KNEE SCOPE,MED/LAT MENISECTOMY Left 12/11/2020    Procedure: KNEE ARTHROSCOPIC MENISCECTOMY MEDIAL;  Surgeon: Bert Candelaria MD;  Location: [facility];  Service: Orthopedics   • VASECTOMY       Family History   Problem Relation Age of Onset   • Lung cancer Mother    • Lung cancer Father    • Cancer Maternal Grandmother         unknown   • Heart disease Maternal Grandfather    • Prostate cancer Maternal Grandfather        Objective:  /96 (BP Location: Left arm, Patient Position: Sitting, Cuff Size: Standard)   Pulse 87   Temp 98 6 °F (37 °C) (Temporal)   Ht 6' 0 09" (1 831 m) Wt 96 9 kg (213 lb 9 6 oz)   SpO2 97%   BMI 28 90 kg/m²     No results found for this or any previous visit (from the past 1344 hour(s))  Physical Exam  Constitutional:       General: He is not in acute distress  Appearance: He is well-developed  He is not diaphoretic  HENT:      Head: Normocephalic and atraumatic  Right Ear: External ear normal       Left Ear: External ear normal       Nose: Nose normal       Mouth/Throat:      Pharynx: No oropharyngeal exudate  Eyes:      General:         Right eye: No discharge  Left eye: No discharge  Conjunctiva/sclera: Conjunctivae normal       Pupils: Pupils are equal, round, and reactive to light  Neck:      Thyroid: No thyromegaly  Cardiovascular:      Rate and Rhythm: Normal rate and regular rhythm  Heart sounds: Normal heart sounds  No murmur heard  No friction rub  No gallop  Pulmonary:      Effort: Pulmonary effort is normal  No respiratory distress  Breath sounds: Normal breath sounds  No stridor  No wheezing or rales  Abdominal:      General: Bowel sounds are normal  There is no distension  Palpations: Abdomen is soft  Tenderness: There is no abdominal tenderness  Musculoskeletal:      Cervical back: Normal range of motion and neck supple  Lymphadenopathy:      Cervical: No cervical adenopathy  Skin:     General: Skin is warm and dry  Findings: No erythema or rash  Neurological:      Mental Status: He is alert and oriented to person, place, and time  Psychiatric:         Behavior: Behavior normal          Thought Content:  Thought content normal          Judgment: Judgment normal

## 2025-05-28 ENCOUNTER — HOSPITAL ENCOUNTER (OUTPATIENT)
Dept: RADIOLOGY | Facility: IMAGING CENTER | Age: 50
Discharge: HOME | End: 2025-05-28
Payer: COMMERCIAL

## 2025-05-28 ENCOUNTER — OFFICE VISIT (OUTPATIENT)
Dept: INTERNAL MEDICINE CLINIC | Facility: OTHER | Age: 50
End: 2025-05-28
Payer: COMMERCIAL

## 2025-05-28 VITALS
HEIGHT: 72 IN | OXYGEN SATURATION: 97 % | DIASTOLIC BLOOD PRESSURE: 92 MMHG | BODY MASS INDEX: 30.45 KG/M2 | TEMPERATURE: 97.6 F | HEART RATE: 73 BPM | WEIGHT: 224.8 LBS | SYSTOLIC BLOOD PRESSURE: 140 MMHG

## 2025-05-28 DIAGNOSIS — G89.29 CHRONIC PAIN OF LEFT ANKLE: Primary | ICD-10-CM

## 2025-05-28 DIAGNOSIS — G89.29 CHRONIC PAIN OF LEFT ANKLE: ICD-10-CM

## 2025-05-28 DIAGNOSIS — M25.572 CHRONIC PAIN OF LEFT ANKLE: ICD-10-CM

## 2025-05-28 DIAGNOSIS — M25.562 ACUTE PAIN OF LEFT KNEE: ICD-10-CM

## 2025-05-28 DIAGNOSIS — M25.572 CHRONIC PAIN OF LEFT ANKLE: Primary | ICD-10-CM

## 2025-05-28 PROCEDURE — 99213 OFFICE O/P EST LOW 20 MIN: CPT

## 2025-05-28 PROCEDURE — 73564 X-RAY EXAM KNEE 4 OR MORE: CPT

## 2025-05-28 PROCEDURE — 73610 X-RAY EXAM OF ANKLE: CPT

## 2025-05-28 NOTE — PROGRESS NOTES
Name: Jareth Wild      : 1975      MRN: [id number]  Encounter Provider: Jud Richards PA-C  Encounter Date: 2025   Encounter department: [facility]  :  Assessment & Plan  Chronic pain of left ankle  Patient reports chronic ankle pain, intermittent x 20 years  Patient reports it was previously believed to be thought from Lyme's disease  Will order x-ray of ankle given increased tenderness over medial malleolus to evaluate bony structures  Recommend RICE, patient will take Tylenol/ibuprofen OTC for pain  Will refer to physical therapy  Also referred to podiatry given chronicity of symptoms  Orders:    XR ankle 3+ vw left; Future    Ambulatory Referral to Physical Therapy; Future    Ambulatory Referral to Podiatry; Future    Acute pain of left knee  Will order x-ray of right knee  Recommend RICE, Tylenol/ibuprofen OTC for pain  Will refer to PT  If no symptom improvement, will refer to orthopedics  Orders:    XR knee 4+ vw left injury; Future    Ambulatory Referral to Physical Therapy; Future           History of Present Illness   Patient is a 49-year-old male presenting to the office for multiple concerns    L Ankle pain x 20 years  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 Injury mechanism: jumping. The pain is present in the left knee. The pain is at a severity of 0/10. The patient is experiencing no pain.  Pertinent negatives include no inability to bear weight, loss of motion, loss of sensation, muscle weakness, numbness or tingling. Nothing aggravates the symptoms. He has tried ice for the symptoms.   Ankle Pain   The incident occurred more than 1 week ago. There was no injury mechanism. The pain is present in the left ankle. The quality of the pain is described as shooting. The pain is at a severity of 6/10. Pertinent negatives include no inability to bear weight, loss of motion, loss of sensation, muscle weakness, numbness or tingling. The symptoms are aggravated by weight bearing. He has tried nothing for the symptoms.     Review of Systems   Constitutional:  Positive for activity change. Negative for chills and fever.   Cardiovascular:  Negative for leg swelling.   Gastrointestinal:  Negative for nausea and vomiting.   Musculoskeletal:  Positive for arthralgias, gait problem and joint swelling (left knee).   Skin:  Negative for color change, rash and wound.   Neurological:  Negative for tingling, weakness and numbness.       Objective   /92 (BP Location: Left arm, Patient Position: Sitting, Cuff Size: Standard)   Pulse 73   Temp 97.6 °F (36.4 °C) (Temporal)   Ht 6' (1.829 m)   Wt 102 kg (224 lb 12.8 oz)   SpO2 97%   BMI 30.49 kg/m²      Physical Exam  Vitals and nursing note reviewed.   Constitutional:       General: He is not in acute distress.     Appearance: Normal appearance. He is not ill-appearing.   HENT:      Head: Normocephalic and atraumatic.      Right Ear: External ear normal.      Left Ear: External ear normal.      Nose: Nose normal.      Mouth/Throat:      Mouth: Mucous membranes are moist.      Pharynx: Oropharynx is clear.     Eyes:      General: No scleral icterus.     Conjunctiva/sclera: Conjunctivae normal.       Cardiovascular:      Rate and Rhythm: Normal rate and regular rhythm.   Pulmonary:      Effort: Pulmonary effort is normal. No respiratory distress.     Musculoskeletal:       Right knee: Normal.      Left knee: Swelling present. No bony tenderness. Deformity: mild.Normal range of motion. No tenderness.      Instability Tests: Anterior drawer test negative. Posterior drawer test negative. Anterior Lachman test negative. Medial Candelario test negative and lateral Candelario test negative.      Right lower leg: No edema.      Left lower leg: No edema.      Right ankle: Normal.      Left ankle: No swelling. Tenderness present over the medial malleolus. Normal range of motion.      Right foot: Normal range of motion and normal capillary refill. No swelling or tenderness. Normal pulse.      Left foot: Normal range of motion and normal capillary refill. No swelling or tenderness. Normal pulse.      Comments: Patient reports ankle pain with full flexion and full extension of the ankle     Skin:     General: Skin is warm and dry.      Coloration: Skin is not pale.      Findings: No bruising, erythema or lesion.     Neurological:      General: No focal deficit present.      Mental Status: He is alert and oriented to person, place, and time. Mental status is at baseline.     Psychiatric:         Mood and Affect: Mood normal.         Behavior: Behavior normal.           Disclaimer: This note was generated with voice recognition software.  Phonetic, grammatical, and spelling errors may be present as a result.  Please contact provider with any concerns or questions

## 2025-05-30 ENCOUNTER — RESULTS FOLLOW-UP (OUTPATIENT)
Age: 50
End: 2025-05-30

## (undated) DEVICE — GLOVE INDICATOR PI UNDERGLOVE SZ 7.5 BLUE

## (undated) DEVICE — GAUZE SPONGES,16 PLY: Brand: CURITY

## (undated) DEVICE — SPONGE PVP SCRUB WING STERILE

## (undated) DEVICE — IMPERVIOUS STOCKINETTE: Brand: DEROYAL

## (undated) DEVICE — U-DRAPE: Brand: CONVERTORS

## (undated) DEVICE — TUBING SUCTION 5MM X 12 FT

## (undated) DEVICE — ACE WRAP 4 IN UNSTERILE

## (undated) DEVICE — 3M™ STERI-STRIP™ REINFORCED ADHESIVE SKIN CLOSURES, R1547, 1/2 IN X 4 IN (12 MM X 100 MM), 6 STRIPS/ENVELOPE: Brand: 3M™ STERI-STRIP™

## (undated) DEVICE — INTENDED FOR TISSUE SEPARATION, AND OTHER PROCEDURES THAT REQUIRE A SHARP SURGICAL BLADE TO PUNCTURE OR CUT.: Brand: BARD-PARKER SAFETY BLADES SIZE 11, STERILE

## (undated) DEVICE — CHLORAPREP HI-LITE 26ML ORANGE

## (undated) DEVICE — SUT MONOCRYL 4-0 PS-2 18 IN Y496G

## (undated) DEVICE — BETHLEHEM UNIVERSAL  ARTHRO PK: Brand: CARDINAL HEALTH

## (undated) DEVICE — BLADE SHAVER DISSECTOR  3.5MM 13CM CRV COOLCUT

## (undated) DEVICE — BLADE SHAVER DISSECTOR 3.5MM 13CM COOLCUT

## (undated) DEVICE — TIBURON EXTREMITY SHEET: Brand: CONVERTORS

## (undated) DEVICE — GLOVE SRG BIOGEL ECLIPSE 7

## (undated) DEVICE — VIAL DECANTER

## (undated) DEVICE — GLOVE SRG BIOGEL 7.5

## (undated) DEVICE — PADDING CAST 4 IN  COTTON STRL

## (undated) DEVICE — TUBING ARTHROSCOPY REDUCE PATIENT

## (undated) DEVICE — ACE WRAP 6 IN UNSTERILE

## (undated) DEVICE — SYRINGE 30ML LL

## (undated) DEVICE — OCCLUSIVE GAUZE STRIP,3% BISMUTH TRIBROMOPHENATE IN PETROLATUM BLEND: Brand: XEROFORM